# Patient Record
Sex: FEMALE | Race: WHITE | Employment: STUDENT | URBAN - METROPOLITAN AREA
[De-identification: names, ages, dates, MRNs, and addresses within clinical notes are randomized per-mention and may not be internally consistent; named-entity substitution may affect disease eponyms.]

---
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
## 2020-01-16 ENCOUNTER — APPOINTMENT (OUTPATIENT)
Dept: GENERAL RADIOLOGY | Facility: HOSPITAL | Age: 20
End: 2020-01-16
Attending: EMERGENCY MEDICINE
Payer: COMMERCIAL

## 2020-01-16 ENCOUNTER — HOSPITAL ENCOUNTER (EMERGENCY)
Facility: HOSPITAL | Age: 20
Discharge: HOME | End: 2020-01-16
Attending: EMERGENCY MEDICINE
Payer: COMMERCIAL

## 2020-01-16 VITALS
WEIGHT: 175 LBS | HEIGHT: 66 IN | OXYGEN SATURATION: 100 % | SYSTOLIC BLOOD PRESSURE: 130 MMHG | TEMPERATURE: 98 F | RESPIRATION RATE: 16 BRPM | DIASTOLIC BLOOD PRESSURE: 78 MMHG | BODY MASS INDEX: 28.13 KG/M2 | HEART RATE: 74 BPM

## 2020-01-16 DIAGNOSIS — M54.9 BACK PAIN WITHOUT RADIATION: ICD-10-CM

## 2020-01-16 DIAGNOSIS — R06.02 SOB (SHORTNESS OF BREATH): Primary | ICD-10-CM

## 2020-01-16 LAB
B-HCG UR QL: NEGATIVE
BILIRUB UR QL: NEGATIVE
COLOR UR: YELLOW
GLUCOSE UR-MCNC: NEGATIVE MG/DL
KETONES UR-MCNC: NEGATIVE MG/DL
LEUKOCYTE ESTERASE UR QL STRIP.AUTO: NEGATIVE
NITRITE UR QL STRIP.AUTO: NEGATIVE
PH UR: 5 [PH] (ref 5–8)
PROT UR-MCNC: NEGATIVE MG/DL
RBC #/AREA URNS AUTO: 3 /HPF
SP GR UR STRIP: 1.01 (ref 1–1.03)
UROBILINOGEN UR STRIP-ACNC: <2
WBC #/AREA URNS AUTO: <1 /HPF

## 2020-01-16 PROCEDURE — 99284 EMERGENCY DEPT VISIT MOD MDM: CPT

## 2020-01-16 PROCEDURE — 93005 ELECTROCARDIOGRAM TRACING: CPT

## 2020-01-16 PROCEDURE — 93010 ELECTROCARDIOGRAM REPORT: CPT | Performed by: EMERGENCY MEDICINE

## 2020-01-16 PROCEDURE — 81001 URINALYSIS AUTO W/SCOPE: CPT | Performed by: EMERGENCY MEDICINE

## 2020-01-16 PROCEDURE — 81001 URINALYSIS AUTO W/SCOPE: CPT

## 2020-01-16 PROCEDURE — 81025 URINE PREGNANCY TEST: CPT

## 2020-01-16 PROCEDURE — 71046 X-RAY EXAM CHEST 2 VIEWS: CPT | Performed by: EMERGENCY MEDICINE

## 2020-01-17 NOTE — ED PROVIDER NOTES
Patient Seen in: [facility]      History   Patient presents with:  Back Pain  Body ache and/or chills    Stated Complaint:     HPI    77-year-old female presents the ER with complaint of an episode of shortness of breath and b and Rhythm: Normal rate and regular rhythm. Pulses: Normal pulses. Heart sounds: Normal heart sounds. Pulmonary:      Effort: Pulmonary effort is normal.      Breath sounds: Normal breath sounds.    Abdominal:      General: Bowel sounds are norm

## 2020-01-17 NOTE — ED INITIAL ASSESSMENT (HPI)
Pt states sudden onset of upper back pain with chills and dyspnea prior to arrival. Pt states states all symptoms are now gone. Pt denies fall or trauma. No respiratory distress noted. 100% on room air.

## 2020-09-16 ENCOUNTER — LAB ENCOUNTER (OUTPATIENT)
Dept: LAB | Facility: HOSPITAL | Age: 20
End: 2020-09-16
Attending: ADVANCED PRACTICE MIDWIFE

## 2020-09-16 ENCOUNTER — OFFICE VISIT (OUTPATIENT)
Dept: OBGYN CLINIC | Facility: CLINIC | Age: 20
End: 2020-09-16
Payer: COMMERCIAL

## 2020-09-16 VITALS
HEART RATE: 92 BPM | WEIGHT: 197 LBS | SYSTOLIC BLOOD PRESSURE: 127 MMHG | HEIGHT: 66 IN | DIASTOLIC BLOOD PRESSURE: 79 MMHG | BODY MASS INDEX: 31.66 KG/M2

## 2020-09-16 DIAGNOSIS — E66.9 OBESITY (BMI 30-39.9): ICD-10-CM

## 2020-09-16 DIAGNOSIS — N91.2 AMENORRHEA: ICD-10-CM

## 2020-09-16 DIAGNOSIS — Z32.02 PREGNANCY EXAMINATION OR TEST, NEGATIVE RESULT: ICD-10-CM

## 2020-09-16 DIAGNOSIS — Z11.3 SCREEN FOR STD (SEXUALLY TRANSMITTED DISEASE): ICD-10-CM

## 2020-09-16 DIAGNOSIS — N91.2 AMENORRHEA: Primary | ICD-10-CM

## 2020-09-16 LAB
B-HCG SERPL-ACNC: <1 MIU/ML
CONTROL LINE PRESENT WITH A CLEAR BACKGROUND (YES/NO): YES YES/NO
KIT LOT #: NORMAL NUMERIC
PREGNANCY TEST, URINE: NEGATIVE
TSI SER-ACNC: 0.81 MIU/ML (ref 0.36–3.74)

## 2020-09-16 PROCEDURE — 3078F DIAST BP <80 MM HG: CPT | Performed by: ADVANCED PRACTICE MIDWIFE

## 2020-09-16 PROCEDURE — 36415 COLL VENOUS BLD VENIPUNCTURE: CPT

## 2020-09-16 PROCEDURE — 3008F BODY MASS INDEX DOCD: CPT | Performed by: ADVANCED PRACTICE MIDWIFE

## 2020-09-16 PROCEDURE — 99214 OFFICE O/P EST MOD 30 MIN: CPT | Performed by: ADVANCED PRACTICE MIDWIFE

## 2020-09-16 PROCEDURE — 84443 ASSAY THYROID STIM HORMONE: CPT

## 2020-09-16 PROCEDURE — 81025 URINE PREGNANCY TEST: CPT | Performed by: ADVANCED PRACTICE MIDWIFE

## 2020-09-16 PROCEDURE — 3074F SYST BP LT 130 MM HG: CPT | Performed by: ADVANCED PRACTICE MIDWIFE

## 2020-09-16 PROCEDURE — 84702 CHORIONIC GONADOTROPIN TEST: CPT

## 2020-09-16 RX ORDER — MEDROXYPROGESTERONE ACETATE 10 MG/1
10 TABLET ORAL DAILY
Qty: 10 TABLET | Refills: 0 | Status: SHIPPED | OUTPATIENT
Start: 2020-09-16 | End: 2020-09-26

## 2020-09-16 NOTE — PROGRESS NOTES
HPI:    Patient ID: Josette Jay is a 23year old female who presents for amenorrhea. Last menstrual period was in June. Reports menses were irregular before that. Denies any excessive hair growth, headaches, visual changes or leaking from breasts. exercise changes. Offered bariatric services at this time and pt declines  Risks of OCP's especially DVT, elevated blood pressure, and failure resulting in pregnancy were reviewed.   Increased risk for heart attack and stroke especially with tobacco use wa

## 2020-09-17 ENCOUNTER — TELEPHONE (OUTPATIENT)
Dept: OBGYN CLINIC | Facility: CLINIC | Age: 20
End: 2020-09-17

## 2020-09-17 LAB
C TRACH DNA SPEC QL NAA+PROBE: NEGATIVE
N GONORRHOEA DNA SPEC QL NAA+PROBE: NEGATIVE

## 2020-09-17 NOTE — TELEPHONE ENCOUNTER
----- Message from Luh Corcoran CNM sent at 9/17/2020  4:34 PM CDT -----  Labs are normal.  Pt should start progesterone as rx

## 2020-09-21 ENCOUNTER — TELEPHONE (OUTPATIENT)
Dept: OBGYN CLINIC | Facility: CLINIC | Age: 20
End: 2020-09-21

## 2021-01-13 ENCOUNTER — HOSPITAL ENCOUNTER (OUTPATIENT)
Age: 21
Discharge: HOME | End: 2021-01-13
Payer: COMMERCIAL

## 2021-01-13 VITALS
TEMPERATURE: 98 F | HEIGHT: 66 IN | RESPIRATION RATE: 16 BRPM | HEART RATE: 99 BPM | DIASTOLIC BLOOD PRESSURE: 70 MMHG | WEIGHT: 190 LBS | OXYGEN SATURATION: 98 % | BODY MASS INDEX: 30.53 KG/M2 | SYSTOLIC BLOOD PRESSURE: 133 MMHG

## 2021-01-13 DIAGNOSIS — J02.9 SORE THROAT: Primary | ICD-10-CM

## 2021-01-13 DIAGNOSIS — U07.1 COVID-19: ICD-10-CM

## 2021-01-13 LAB
S PYO AG THROAT QL: NEGATIVE
SARS-COV-2 RNA RESP QL NAA+PROBE: DETECTED

## 2021-01-13 PROCEDURE — 99213 OFFICE O/P EST LOW 20 MIN: CPT | Performed by: NURSE PRACTITIONER

## 2021-01-13 PROCEDURE — 87880 STREP A ASSAY W/OPTIC: CPT | Performed by: NURSE PRACTITIONER

## 2021-01-14 NOTE — ED PROVIDER NOTES
Patient presents with:  Sore Throat      HPI:     Tatyana Rowe is a 21year old female who presents for a sore throat for 2 days. No fevers or chills. She states at times she wakes up with some nasal congestion and a dry cough.  No difficulty breathin Attends meetings of clubs or organizations: Not on file        Relationship status: Not on file      Intimate partner violence        Fear of current or ex partner: Not on file        Emotionally abused: Not on file        Physically abused: Not on file POCT Rapid Strep      Contact and droplet isolation status Continuous      Labs performed this visit:  Recent Results (from the past 10 hour(s))   [facility] POCT RAPID STREP    Collection Time: 01/13/21  7:46 PM   Result Value Ref Range    POCT Rapid Strep Negat

## 2021-12-29 ENCOUNTER — HOSPITAL ENCOUNTER (OUTPATIENT)
Age: 21
Discharge: HOME | End: 2021-12-29
Payer: COMMERCIAL

## 2021-12-29 VITALS
HEIGHT: 66 IN | RESPIRATION RATE: 18 BRPM | BODY MASS INDEX: 31.34 KG/M2 | HEART RATE: 87 BPM | OXYGEN SATURATION: 100 % | SYSTOLIC BLOOD PRESSURE: 115 MMHG | TEMPERATURE: 97 F | DIASTOLIC BLOOD PRESSURE: 66 MMHG | WEIGHT: 195 LBS

## 2021-12-29 DIAGNOSIS — Z20.822 ENCOUNTER FOR LABORATORY TESTING FOR COVID-19 VIRUS: Primary | ICD-10-CM

## 2021-12-29 DIAGNOSIS — J02.9 SORE THROAT: ICD-10-CM

## 2021-12-29 LAB — S PYO AG THROAT QL: NEGATIVE

## 2021-12-29 PROCEDURE — 87880 STREP A ASSAY W/OPTIC: CPT | Performed by: PHYSICIAN ASSISTANT

## 2021-12-29 PROCEDURE — 99213 OFFICE O/P EST LOW 20 MIN: CPT | Performed by: PHYSICIAN ASSISTANT

## 2021-12-29 NOTE — ED INITIAL ASSESSMENT (HPI)
Pt presents to the IC with c/o a sore throat and nasal congestion since earlier this week. +exposure to covid.

## 2021-12-30 NOTE — ED PROVIDER NOTES
Patient Seen in: [facility] [place]      History   Patient presents with:  Cough/URI    Stated Complaint: VWR: [id number] Covid/strep Test- sore throat,runny nose,cough    Subjective:   HPI    23 yo female here for evaluation of sore throat, runny no General: No focal deficit present. Mental Status: She is alert and oriented to person, place, and time.    Psychiatric:         Mood and Affect: Mood normal.         Behavior: Behavior normal.            ED Course     Labs Reviewed   POCT RAPID STREP -

## 2022-01-01 LAB — SARS-COV-2 RNA RESP QL NAA+PROBE: NOT DETECTED

## 2022-01-20 ENCOUNTER — HOSPITAL ENCOUNTER (OUTPATIENT)
Age: 22
Discharge: HOME | End: 2022-01-20
Payer: COMMERCIAL

## 2022-01-20 DIAGNOSIS — Z20.822 ENCOUNTER FOR LABORATORY TESTING FOR COVID-19 VIRUS: Primary | ICD-10-CM

## 2022-01-22 LAB — SARS-COV-2 RNA RESP QL NAA+PROBE: DETECTED

## 2022-10-18 ENCOUNTER — HOSPITAL ENCOUNTER (OUTPATIENT)
Age: 22
Discharge: HOME | End: 2022-10-18

## 2022-10-18 VITALS
DIASTOLIC BLOOD PRESSURE: 66 MMHG | OXYGEN SATURATION: 99 % | SYSTOLIC BLOOD PRESSURE: 129 MMHG | RESPIRATION RATE: 20 BRPM | HEART RATE: 81 BPM | TEMPERATURE: 97 F

## 2022-10-18 DIAGNOSIS — J06.9 VIRAL URI: ICD-10-CM

## 2022-10-18 DIAGNOSIS — Z20.822 ENCOUNTER FOR LABORATORY TESTING FOR COVID-19 VIRUS: Primary | ICD-10-CM

## 2022-10-18 LAB — SARS-COV-2 RNA RESP QL NAA+PROBE: NOT DETECTED

## 2022-10-18 PROCEDURE — 99213 OFFICE O/P EST LOW 20 MIN: CPT | Performed by: PHYSICIAN ASSISTANT

## 2022-10-18 PROCEDURE — U0002 COVID-19 LAB TEST NON-CDC: HCPCS | Performed by: PHYSICIAN ASSISTANT

## 2022-11-19 NOTE — ASSESSMENT & PLAN NOTE
Start Augmentin 875 mg PO BID for 10 days. Supportive care includes:    • encourage fluid intake   • Advise patient to take Tylenol/Ibuprofen as needed for pain.   • warm salt water gargles   • throat lozenges, hard candy, or frozen desserts   • soft foods Attending

## 2023-01-06 ENCOUNTER — OFFICE VISIT (OUTPATIENT)
Dept: FAMILY MEDICINE CLINIC | Facility: CLINIC | Age: 23
End: 2023-01-06
Payer: COMMERCIAL

## 2023-01-06 VITALS
SYSTOLIC BLOOD PRESSURE: 123 MMHG | WEIGHT: 185 LBS | DIASTOLIC BLOOD PRESSURE: 79 MMHG | HEART RATE: 99 BPM | OXYGEN SATURATION: 96 % | HEIGHT: 65.5 IN | BODY MASS INDEX: 30.45 KG/M2

## 2023-01-06 DIAGNOSIS — Z91.018 FOOD ALLERGY: ICD-10-CM

## 2023-01-06 DIAGNOSIS — F41.1 GAD (GENERALIZED ANXIETY DISORDER): ICD-10-CM

## 2023-01-06 DIAGNOSIS — Z00.129 ENCOUNTER FOR ROUTINE CHILD HEALTH EXAMINATION WITHOUT ABNORMAL FINDINGS: Primary | ICD-10-CM

## 2023-01-06 PROCEDURE — 3078F DIAST BP <80 MM HG: CPT | Performed by: FAMILY MEDICINE

## 2023-01-06 PROCEDURE — 3008F BODY MASS INDEX DOCD: CPT | Performed by: FAMILY MEDICINE

## 2023-01-06 PROCEDURE — 99395 PREV VISIT EST AGE 18-39: CPT | Performed by: FAMILY MEDICINE

## 2023-01-06 PROCEDURE — 90715 TDAP VACCINE 7 YRS/> IM: CPT | Performed by: FAMILY MEDICINE

## 2023-01-06 PROCEDURE — 90471 IMMUNIZATION ADMIN: CPT | Performed by: FAMILY MEDICINE

## 2023-01-06 PROCEDURE — 3074F SYST BP LT 130 MM HG: CPT | Performed by: FAMILY MEDICINE

## 2023-03-13 ENCOUNTER — OFFICE VISIT (OUTPATIENT)
Dept: FAMILY MEDICINE CLINIC | Facility: CLINIC | Age: 23
End: 2023-03-13

## 2023-03-13 VITALS
WEIGHT: 184 LBS | BODY MASS INDEX: 30.29 KG/M2 | HEART RATE: 93 BPM | DIASTOLIC BLOOD PRESSURE: 74 MMHG | HEIGHT: 65.5 IN | SYSTOLIC BLOOD PRESSURE: 124 MMHG

## 2023-03-13 DIAGNOSIS — M54.50 THORACOLUMBAR BACK PAIN: Primary | ICD-10-CM

## 2023-03-13 DIAGNOSIS — M54.6 THORACOLUMBAR BACK PAIN: Primary | ICD-10-CM

## 2023-03-13 PROCEDURE — 99212 OFFICE O/P EST SF 10 MIN: CPT | Performed by: FAMILY MEDICINE

## 2023-03-13 PROCEDURE — 3008F BODY MASS INDEX DOCD: CPT | Performed by: FAMILY MEDICINE

## 2023-03-13 PROCEDURE — 3074F SYST BP LT 130 MM HG: CPT | Performed by: FAMILY MEDICINE

## 2023-03-13 PROCEDURE — 3078F DIAST BP <80 MM HG: CPT | Performed by: FAMILY MEDICINE

## 2023-03-13 NOTE — PROGRESS NOTES
Blood pressure 124/74, pulse 93, height 5' 5.5\" (1.664 m), weight 184 lb (83.5 kg), last menstrual period 11/19/2022, not currently breastfeeding. Complaining of intermittent mid to lower back pain. Denies pain down the legs no injury. No pain at this time. Works at [employer] all day and [employer]. Small company.     Objective patient is comfortable no apparent distress    Assessment intermittent mid to lower back pain asymptomatic at this time    Plan ergonomics information given patient to get a Sulema roll for the back follow-up if no improvement

## 2023-06-03 ENCOUNTER — OFFICE VISIT (OUTPATIENT)
Dept: FAMILY MEDICINE CLINIC | Facility: CLINIC | Age: 23
End: 2023-06-03
Payer: COMMERCIAL

## 2023-06-03 VITALS
HEIGHT: 65.5 IN | TEMPERATURE: 99 F | RESPIRATION RATE: 18 BRPM | WEIGHT: 195 LBS | DIASTOLIC BLOOD PRESSURE: 76 MMHG | HEART RATE: 113 BPM | OXYGEN SATURATION: 98 % | BODY MASS INDEX: 32.1 KG/M2 | SYSTOLIC BLOOD PRESSURE: 134 MMHG

## 2023-06-03 DIAGNOSIS — J01.90 ACUTE RHINOSINUSITIS: Primary | ICD-10-CM

## 2023-06-03 DIAGNOSIS — J02.9 SORE THROAT: ICD-10-CM

## 2023-06-03 LAB
CONTROL LINE PRESENT WITH A CLEAR BACKGROUND (YES/NO): YES YES/NO
KIT EXPIRATION DATE: NORMAL DATE
KIT LOT #: NORMAL NUMERIC
STREP GRP A CUL-SCR: NEGATIVE

## 2023-06-03 PROCEDURE — 3075F SYST BP GE 130 - 139MM HG: CPT | Performed by: PHYSICIAN ASSISTANT

## 2023-06-03 PROCEDURE — 3078F DIAST BP <80 MM HG: CPT | Performed by: PHYSICIAN ASSISTANT

## 2023-06-03 PROCEDURE — 87880 STREP A ASSAY W/OPTIC: CPT | Performed by: PHYSICIAN ASSISTANT

## 2023-06-03 PROCEDURE — 3008F BODY MASS INDEX DOCD: CPT | Performed by: PHYSICIAN ASSISTANT

## 2023-06-03 PROCEDURE — 87635 SARS-COV-2 COVID-19 AMP PRB: CPT | Performed by: PHYSICIAN ASSISTANT

## 2023-06-03 PROCEDURE — 99213 OFFICE O/P EST LOW 20 MIN: CPT | Performed by: PHYSICIAN ASSISTANT

## 2023-06-03 RX ORDER — AMOXICILLIN AND CLAVULANATE POTASSIUM 875; 125 MG/1; MG/1
1 TABLET, FILM COATED ORAL 2 TIMES DAILY
Qty: 14 TABLET | Refills: 0 | Status: SHIPPED | OUTPATIENT
Start: 2023-06-03 | End: 2023-06-10

## 2023-06-04 LAB — SARS-COV-2 RNA RESP QL NAA+PROBE: NOT DETECTED

## 2024-06-28 ENCOUNTER — OFFICE VISIT (OUTPATIENT)
Dept: FAMILY MEDICINE CLINIC | Facility: CLINIC | Age: 24
End: 2024-06-28

## 2024-06-28 VITALS
SYSTOLIC BLOOD PRESSURE: 106 MMHG | WEIGHT: 225 LBS | DIASTOLIC BLOOD PRESSURE: 66 MMHG | HEIGHT: 65.5 IN | BODY MASS INDEX: 37.04 KG/M2 | HEART RATE: 88 BPM

## 2024-06-28 DIAGNOSIS — R63.5 WEIGHT GAIN: Primary | ICD-10-CM

## 2024-06-28 PROCEDURE — 99213 OFFICE O/P EST LOW 20 MIN: CPT | Performed by: FAMILY MEDICINE

## 2024-06-28 NOTE — PROGRESS NOTES
Blood pressure 106/66, pulse 88, height 5' 5.5\" (1.664 m), weight 225 lb (102.1 kg), not currently breastfeeding.          Patient presents today concerned about weight gain.  She states the weight gain began before she started the sertraline.  She also reports that she feels the sertraline is working for her anxiety very well denies any suicidal ideation no drug use.    Condoms for contraception.    Objective body mass index 36.7    Assessment weight gain    Plan fasting blood test ordered will follow with results dietary information given discussed exercise

## 2024-06-29 LAB
ABSOLUTE BASOPHILS: 58 CELLS/UL (ref 0–200)
ABSOLUTE EOSINOPHILS: 446 CELLS/UL (ref 15–500)
ABSOLUTE LYMPHOCYTES: 2735 CELLS/UL (ref 850–3900)
ABSOLUTE MONOCYTES: 553 CELLS/UL (ref 200–950)
ABSOLUTE NEUTROPHILS: 5907 CELLS/UL (ref 1500–7800)
BASOPHILS: 0.6 %
CHOL/HDLC RATIO: 3.5 (CALC)
CHOLESTEROL, TOTAL: 163 MG/DL
EOSINOPHILS: 4.6 %
GLUCOSE: 94 MG/DL (ref 65–139)
HCG, TOTAL, QL: POSITIVE
HDL CHOLESTEROL: 46 MG/DL
HEMATOCRIT: 38 % (ref 35–45)
HEMOGLOBIN A1C: 5.5 % OF TOTAL HGB
HEMOGLOBIN: 12.2 G/DL (ref 11.7–15.5)
LDL-CHOLESTEROL: 94 MG/DL (CALC)
LYMPHOCYTES: 28.2 %
MCH: 29.2 PG (ref 27–33)
MCHC: 32.1 G/DL (ref 32–36)
MCV: 90.9 FL (ref 80–100)
MONOCYTES: 5.7 %
MPV: 10.1 FL (ref 7.5–12.5)
NEUTROPHILS: 60.9 %
NON-HDL CHOLESTEROL: 117 MG/DL (CALC)
PLATELET COUNT: 379 THOUSAND/UL (ref 140–400)
RDW: 11.8 % (ref 11–15)
RED BLOOD CELL COUNT: 4.18 MILLION/UL (ref 3.8–5.1)
TRIGLYCERIDES: 132 MG/DL
TSH W/REFLEX TO FT4: 1.17 MIU/L
VITAMIN D, 25-OH, TOTAL: 10 NG/ML (ref 30–100)
WHITE BLOOD CELL COUNT: 9.7 THOUSAND/UL (ref 3.8–10.8)

## 2024-07-05 ENCOUNTER — OFFICE VISIT (OUTPATIENT)
Dept: OBGYN CLINIC | Facility: CLINIC | Age: 24
End: 2024-07-05
Payer: COMMERCIAL

## 2024-07-05 VITALS
BODY MASS INDEX: 37.65 KG/M2 | HEIGHT: 65 IN | SYSTOLIC BLOOD PRESSURE: 117 MMHG | HEART RATE: 96 BPM | DIASTOLIC BLOOD PRESSURE: 78 MMHG | WEIGHT: 226 LBS

## 2024-07-05 DIAGNOSIS — Z71.89 PRENATAL CONSULT: Primary | ICD-10-CM

## 2024-07-05 DIAGNOSIS — Z34.91 PREGNANCY WITH UNCERTAIN DATES IN FIRST TRIMESTER (HCC): ICD-10-CM

## 2024-07-05 LAB
CONTROL LINE PRESENT WITH A CLEAR BACKGROUND (YES/NO): YES YES/NO
KIT LOT #: NORMAL NUMERIC
PREGNANCY TEST, URINE: POSITIVE

## 2024-07-05 RX ORDER — ASPIRIN 81 MG/1
120 TABLET, CHEWABLE ORAL DAILY
Qty: 60 TABLET | Refills: 2 | Status: SHIPPED | OUTPATIENT
Start: 2024-07-05

## 2024-07-05 NOTE — PROGRESS NOTES
Monet Blanc is a 23 year old female.    HPI:     Chief Complaint   Patient presents with    Gyn Exam     Missed mense; LMP 2024, light cramping no spotting. Wanted an ultrasound done to see if everything is okay.            Sure LMP 24, BOB 25, now at 5 6/7 wks.    Has somewhat irregular periods sometimes 1-2 weeks late.   Had + HGC with PCP on   Denies pain or bleeding. + nausea, No vomiting.  Denies fevers, flu like symptoms or symptoms of URI  Current medications: PNV    OB History    Para Term  AB Living   1 0 0 0 0 0   SAB IAB Ectopic Multiple Live Births   0 0 0 0 0       Period Cycle (Days): 1 week early or late  Period Duration (Days): 4-5 days  Period Flow: heavy-light  Use of Birth Control (if yes, specify type): None      HISTORY:  Past Medical History:    Constipation      History reviewed. No pertinent surgical history.   Family History   Problem Relation Age of Onset    Diabetes Maternal Grandmother     Heart Disease Maternal Grandmother     Heart Disorder Other         Sidden cardiac death-no close relative      Social History:   Social History     Socioeconomic History    Marital status: Single   Tobacco Use    Smoking status: Never    Smokeless tobacco: Never   Substance and Sexual Activity    Alcohol use: No    Drug use: No   Other Topics Concern    Caffeine Concern Yes     Comment: Sod        Medications (Active prior to today's visit):  Current Outpatient Medications   Medication Sig Dispense Refill    aspirin (ASPIRIN LOW DOSE) 81 MG Oral Chew Tab Chew 1.5 tablets (121.5 mg total) by mouth daily. 60 tablet 2    sertraline 50 MG Oral Tab Take 1.5 tablets (75 mg total) by mouth daily. 45 tablet 0       Allergies:  No Known Allergies      ROS:       History obtained from the patient  General ROS: positive for  - fatigue  negative for - chills, fever, or night sweats  Psychological ROS: negative for - anxiety, depression, mood swings, or suicidal  ideation  ENT ROS: negative for - headaches, nasal congestion, or nasal discharge  Allergy and Immunology ROS: negative for - nasal congestion or postnasal drip  Respiratory ROS: no cough, shortness of breath, or wheezing  Cardiovascular ROS: no chest pain or dyspnea on exertion  Gastrointestinal ROS: positive for - nausea/vomiting  negative for - abdominal pain or change in stools  Genito-Urinary ROS: no dysuria, trouble voiding, or hematuria  Neurological ROS: negative for - dizziness or headaches      PHYSICAL EXAM:     Vitals:    07/05/24 1331   BP: 117/78   Pulse: 96       Physical Exam  Constitutional:       General: She is not in acute distress.     Appearance: Normal appearance. She is not ill-appearing.   Pulmonary:      Effort: Pulmonary effort is normal.   Neurological:      Mental Status: She is alert and oriented to person, place, and time.   Psychiatric:         Mood and Affect: Mood normal.         Behavior: Behavior normal.               ASSESSMENT/PLAN:      Diagnoses and all orders for this visit:    Prenatal consult  -     POC Urine pregnancy test [63192]    Pregnancy with uncertain dates in first trimester (HCC)  -     US PREG 1ST TRIM W/EV (CPT=76801/60934); Future    Other orders  -     aspirin (ASPIRIN LOW DOSE) 81 MG Oral Chew Tab; Chew 1.5 tablets (121.5 mg total) by mouth daily.               1.  Continue PNV with DHA.   2.  Reviewed pregnancy recommendations and avoidances of pregnancy and written information provided.   3.  Travel discussed, increased risk of clotting in pregnancy, so recommend use of support stockings with flights longer than 3 hours, movement every 2-3 hours if driving  4.  Warning signs reviewed advised to call office if occur.    5.  First Trimester chromosomal screening, genetic screening, free Cell Fetal DNA, carrier screening and US schedule discussed.   6. Ultrasound to confirm viability and dating since has somewhat irregular cycles  7. ONTD risks discussed. no  risk factors identified.     8. Pre- Eclampsia Risk Assessment:   High risk Factors- none  Moderate Risk Factors: Primip, Obesity    High risk factors. 1 of below:  -Hx of pre-e  -Autoimmune disease (lupus, antiphospholipid syndrome)  -Multifetal pregnancy  -CHTN  -DM (type 1 or 2)    Moderate risk factors. 2 or More:   -Nullip  -Obesity  -Family hx preeclampsia (mother or sister)  - Socioeconomic characteristics (AA race, low socioeconomic status)  -AMA  -Personal history factors (Hx of low birth weight or SGA, previous adverse pregnancy outcome, > 10 yr preg interval)    Recommend start baby/low dose ASA in the 12th-13th week of gestation.  Evidence shows that it reduces the risk of preelampsia, as well as other adverse pregnancy outcomes such as  delivery and IUGR by about 10-20%.     9. Avoid hot tubs, saunas, steam rooms. Don't change cat litter if has cat.    10.  Schedule RN OB ED visit         My total time spent caring for the patient on the day of the encounter:  30 minutes, which included: chart review, exam, care coordination, charting, and counseling as per above.          2024  Jazmine Plascencia CNM

## 2024-07-22 ENCOUNTER — NURSE ONLY (OUTPATIENT)
Dept: OBGYN CLINIC | Facility: CLINIC | Age: 24
End: 2024-07-22
Payer: COMMERCIAL

## 2024-07-22 DIAGNOSIS — Z34.01 ENCOUNTER FOR SUPERVISION OF NORMAL FIRST PREGNANCY IN FIRST TRIMESTER (HCC): Primary | ICD-10-CM

## 2024-07-22 NOTE — PROGRESS NOTES
Pt Name and  verified.  OB History    Para Term  AB Living   1 0 0 0 0 0   SAB IAB Ectopic Multiple Live Births   0 0 0 0 0       Pt called today for RN OB Education. [place] about midwives through her mom a long time ago. Would like to have epidural and vaginal delivery but understands if she were to need a .     LMP: 2024    Pre  BMI: 32.45    EPDS score:    Working BOB: 2025  Hx of genetic abnormality in family: none  Hx of varicella: unsure, may have had them.   Covid Vaccine: not vaccinated.    Sterilization/Contraception: most likely not possibly just condoms.     SDOH Screening: Pt. Answered YES to any 5P questions no  risk factors are present.     Pt. Education was provided on OUD and pregnancy, including the benefits of treatment for mom and baby and the risk of LUDY. Pt. Given OUD and LUDY handouts.    Educational material reviewed with patient: Prenatal care, nutrition, weight gain recommendations, travel, exercise, intercourse, pregnancy changes, safe medications, pregnancy and work, fetal movement, labor and  labor, warning signs, food safety, tdap, cord blood, breastfeeding yes and formula feed , circumcision undecided, and Group B strep.     Blood transfusion if needed: yes, if needed in PN care.     PN labs: placed and pt aware to complete prior to New Ob apt.     Optional genetic screening labs were reviewed: Cell FreeDNA, FTS with US, Quad screen MSAFP and CF screening. Would like Maria NIPT testing. Kit to be picked up by pt.     [facility] Media Policy: reviewed and voiced understanding.     NOB apt:  scheduled with Phyllis Teixeira

## 2024-08-16 ENCOUNTER — HOSPITAL ENCOUNTER (OUTPATIENT)
Dept: ULTRASOUND IMAGING | Age: 24
Discharge: HOME | End: 2024-08-16
Attending: ADVANCED PRACTICE MIDWIFE
Payer: COMMERCIAL

## 2024-08-16 DIAGNOSIS — Z34.91 PREGNANCY WITH UNCERTAIN DATES IN FIRST TRIMESTER (HCC): ICD-10-CM

## 2024-08-16 PROCEDURE — 76801 OB US < 14 WKS SINGLE FETUS: CPT | Performed by: ADVANCED PRACTICE MIDWIFE

## 2024-08-19 ENCOUNTER — INITIAL PRENATAL (OUTPATIENT)
Dept: OBGYN CLINIC | Facility: CLINIC | Age: 24
End: 2024-08-19
Payer: COMMERCIAL

## 2024-08-19 ENCOUNTER — TELEPHONE (OUTPATIENT)
Dept: OBGYN CLINIC | Facility: CLINIC | Age: 24
End: 2024-08-19

## 2024-08-19 ENCOUNTER — LAB ENCOUNTER (OUTPATIENT)
Dept: LAB | Facility: HOSPITAL | Age: 24
End: 2024-08-19
Attending: ADVANCED PRACTICE MIDWIFE
Payer: COMMERCIAL

## 2024-08-19 VITALS
HEART RATE: 106 BPM | SYSTOLIC BLOOD PRESSURE: 117 MMHG | BODY MASS INDEX: 37 KG/M2 | DIASTOLIC BLOOD PRESSURE: 76 MMHG | WEIGHT: 221 LBS

## 2024-08-19 DIAGNOSIS — Z34.91 PRENATAL CARE, FIRST TRIMESTER (HCC): Primary | ICD-10-CM

## 2024-08-19 DIAGNOSIS — Z12.4 CERVICAL CANCER SCREENING: ICD-10-CM

## 2024-08-19 DIAGNOSIS — Z11.3 SCREENING EXAMINATION FOR STI: ICD-10-CM

## 2024-08-19 DIAGNOSIS — Z34.01 ENCOUNTER FOR SUPERVISION OF NORMAL FIRST PREGNANCY IN FIRST TRIMESTER (HCC): Primary | ICD-10-CM

## 2024-08-19 PROBLEM — Z00.129 ENCOUNTER FOR ROUTINE CHILD HEALTH EXAMINATION WITHOUT ABNORMAL FINDINGS: Status: RESOLVED | Noted: 2017-05-05 | Resolved: 2024-08-19

## 2024-08-19 PROBLEM — F90.9 ADHD: Status: ACTIVE | Noted: 2024-08-19

## 2024-08-19 PROBLEM — F41.9 ANXIETY AND DEPRESSION: Status: ACTIVE | Noted: 2024-08-19

## 2024-08-19 PROBLEM — F32.A ANXIETY AND DEPRESSION: Status: ACTIVE | Noted: 2024-08-19

## 2024-08-19 LAB
ANTIBODY SCREEN: NEGATIVE
BASOPHILS # BLD AUTO: 0.05 X10(3) UL (ref 0–0.2)
BASOPHILS NFR BLD AUTO: 0.4 %
DEPRECATED RDW RBC AUTO: 38.1 FL (ref 35.1–46.3)
EOSINOPHIL # BLD AUTO: 0.44 X10(3) UL (ref 0–0.7)
EOSINOPHIL NFR BLD AUTO: 3.6 %
ERYTHROCYTE [DISTWIDTH] IN BLOOD BY AUTOMATED COUNT: 11.9 % (ref 11–15)
EST. AVERAGE GLUCOSE BLD GHB EST-MCNC: 100 MG/DL (ref 68–126)
HBA1C MFR BLD: 5.1 % (ref ?–5.7)
HBV SURFACE AG SER-ACNC: 0.13 [IU]/L
HBV SURFACE AG SERPL QL IA: NONREACTIVE
HCT VFR BLD AUTO: 37.5 %
HCV AB SERPL QL IA: NONREACTIVE
HGB BLD-MCNC: 13.1 G/DL
IMM GRANULOCYTES # BLD AUTO: 0.04 X10(3) UL (ref 0–1)
IMM GRANULOCYTES NFR BLD: 0.3 %
LYMPHOCYTES # BLD AUTO: 3.05 X10(3) UL (ref 1–4)
LYMPHOCYTES NFR BLD AUTO: 24.9 %
MCH RBC QN AUTO: 30.3 PG (ref 26–34)
MCHC RBC AUTO-ENTMCNC: 34.9 G/DL (ref 31–37)
MCV RBC AUTO: 86.8 FL
MONOCYTES # BLD AUTO: 0.79 X10(3) UL (ref 0.1–1)
MONOCYTES NFR BLD AUTO: 6.4 %
NEUTROPHILS # BLD AUTO: 7.89 X10 (3) UL (ref 1.5–7.7)
NEUTROPHILS # BLD AUTO: 7.89 X10(3) UL (ref 1.5–7.7)
NEUTROPHILS NFR BLD AUTO: 64.4 %
PLATELET # BLD AUTO: 388 10(3)UL (ref 150–450)
RBC # BLD AUTO: 4.32 X10(6)UL
RH BLOOD TYPE: POSITIVE
RUBV IGG SER QL: POSITIVE
RUBV IGG SER-ACNC: 32 IU/ML (ref 10–?)
T PALLIDUM AB SER QL IA: NONREACTIVE
WBC # BLD AUTO: 12.3 X10(3) UL (ref 4–11)

## 2024-08-19 PROCEDURE — 87340 HEPATITIS B SURFACE AG IA: CPT | Performed by: ADVANCED PRACTICE MIDWIFE

## 2024-08-19 PROCEDURE — 86850 RBC ANTIBODY SCREEN: CPT

## 2024-08-19 PROCEDURE — 36415 COLL VENOUS BLD VENIPUNCTURE: CPT | Performed by: ADVANCED PRACTICE MIDWIFE

## 2024-08-19 PROCEDURE — 83020 HEMOGLOBIN ELECTROPHORESIS: CPT

## 2024-08-19 PROCEDURE — 87086 URINE CULTURE/COLONY COUNT: CPT | Performed by: ADVANCED PRACTICE MIDWIFE

## 2024-08-19 PROCEDURE — 86803 HEPATITIS C AB TEST: CPT | Performed by: ADVANCED PRACTICE MIDWIFE

## 2024-08-19 PROCEDURE — 83036 HEMOGLOBIN GLYCOSYLATED A1C: CPT | Performed by: ADVANCED PRACTICE MIDWIFE

## 2024-08-19 PROCEDURE — 85025 COMPLETE CBC W/AUTO DIFF WBC: CPT | Performed by: ADVANCED PRACTICE MIDWIFE

## 2024-08-19 PROCEDURE — 86762 RUBELLA ANTIBODY: CPT

## 2024-08-19 PROCEDURE — 86787 VARICELLA-ZOSTER ANTIBODY: CPT | Performed by: ADVANCED PRACTICE MIDWIFE

## 2024-08-19 PROCEDURE — 87389 HIV-1 AG W/HIV-1&-2 AB AG IA: CPT | Performed by: ADVANCED PRACTICE MIDWIFE

## 2024-08-19 PROCEDURE — 86900 BLOOD TYPING SEROLOGIC ABO: CPT | Performed by: ADVANCED PRACTICE MIDWIFE

## 2024-08-19 PROCEDURE — 86901 BLOOD TYPING SEROLOGIC RH(D): CPT | Performed by: ADVANCED PRACTICE MIDWIFE

## 2024-08-19 PROCEDURE — 86780 TREPONEMA PALLIDUM: CPT | Performed by: ADVANCED PRACTICE MIDWIFE

## 2024-08-19 PROCEDURE — 83021 HEMOGLOBIN CHROMOTOGRAPHY: CPT

## 2024-08-19 RX ORDER — MULTIVIT,IRON,MINERALS/LUTEIN
TABLET ORAL
COMMUNITY

## 2024-08-19 NOTE — TELEPHONE ENCOUNTER
Pt called and informed that her first trimester labs need to be completed. Pt voices she will complete theses labs before her 6 pm appointment tonight with Phyllis Teixeira. Orders placed.

## 2024-08-19 NOTE — PROGRESS NOTES
Here for NOB visit. Reports fatigue and nausea. Denies bleeding, pelvic pain, vomiting     Patient's last menstrual period was 2024 (exact date). 3/7/2025, by Ultrasound 11w3d     NOB labs- Done before visit  Genetic screening- declines  Ultrasound: 24 at 11.0wks  Med hx: anxiety, depression, ADHD  Allergies: NKDA.    Problem list- Updated  EPDS=2 on 24 at RN education visit    Pre-e risk: obesity. Will start ASA  Prior births:n/a    Physical: WNL  Pap: collected and sent    Warning signs reviewed.

## 2024-08-19 NOTE — TELEPHONE ENCOUNTER
----- Message from Phyllis Teixeira sent at 8/18/2024  1:21 PM CDT -----  Regarding: NOB labs  This patient has her NOB visit with me on Monday. She had her nurse education visit but it looks like labs were not ordered. Please place order and call patient to have her complete them prior to visit. Thanks!

## 2024-08-20 LAB
C TRACH DNA SPEC QL NAA+PROBE: NEGATIVE
N GONORRHOEA DNA SPEC QL NAA+PROBE: NEGATIVE

## 2024-08-21 LAB
HGB A2 MFR BLD: 2.5 % (ref 1.5–3.5)
HGB PNL BLD ELPH: 97.5 % (ref 95.5–100)
VZV IGG SER IA-ACNC: 53.96 (ref 165–?)

## 2024-08-23 PROBLEM — O09.899 MATERNAL VARICELLA, NON-IMMUNE (HCC): Status: ACTIVE | Noted: 2024-08-23

## 2024-08-23 PROBLEM — Z28.39 MATERNAL VARICELLA, NON-IMMUNE (HCC): Status: ACTIVE | Noted: 2024-08-23

## 2024-09-18 ENCOUNTER — ROUTINE PRENATAL (OUTPATIENT)
Dept: OBGYN CLINIC | Facility: CLINIC | Age: 24
End: 2024-09-18

## 2024-09-18 VITALS
SYSTOLIC BLOOD PRESSURE: 130 MMHG | HEART RATE: 102 BPM | DIASTOLIC BLOOD PRESSURE: 83 MMHG | WEIGHT: 219 LBS | BODY MASS INDEX: 36 KG/M2

## 2024-09-18 DIAGNOSIS — Z34.92 PRENATAL CARE, SECOND TRIMESTER (HCC): Primary | ICD-10-CM

## 2024-09-18 DIAGNOSIS — R03.0 ELEVATED BLOOD PRESSURE READING: ICD-10-CM

## 2024-09-18 DIAGNOSIS — O99.210 OBESITY IN PREGNANCY (HCC): ICD-10-CM

## 2024-09-18 PROBLEM — J03.90 ACUTE TONSILLITIS, UNSPECIFIED: Status: RESOLVED | Noted: 2019-06-05 | Resolved: 2024-09-18

## 2024-09-18 PROBLEM — N91.2 AMENORRHEA: Status: RESOLVED | Noted: 2020-09-16 | Resolved: 2024-09-18

## 2024-09-18 NOTE — PROGRESS NOTES
Active fetus Denies any complaints.  Denies any vaginal bleeding, leaking of fluid or vaginal discharge.  Pt will be traveling to [facility] and will return after 20 weeks gestation  Level 2 ultrasound ordered and liane will schedule before she leaves.  Pt fell prior to pregnancy and hurt her tailbone it has started to bother her again - will follow up with PCP Elevated Blood pressure reading retook with large cuff (appropriate size) and repeat 130/83  Pt to monitor blood pressure at home instructions given My chart link sent Warning signs reviewed  All questions answered.

## 2024-11-04 ENCOUNTER — HOSPITAL ENCOUNTER (OUTPATIENT)
Dept: PERINATAL CARE | Facility: HOSPITAL | Age: 24
Discharge: HOME | End: 2024-11-04
Attending: OBSTETRICS & GYNECOLOGY
Payer: COMMERCIAL

## 2024-11-04 ENCOUNTER — HOSPITAL ENCOUNTER (OUTPATIENT)
Dept: PERINATAL CARE | Facility: HOSPITAL | Age: 24
End: 2024-11-04
Attending: ADVANCED PRACTICE MIDWIFE
Payer: COMMERCIAL

## 2024-11-04 VITALS
DIASTOLIC BLOOD PRESSURE: 81 MMHG | WEIGHT: 212 LBS | SYSTOLIC BLOOD PRESSURE: 124 MMHG | BODY MASS INDEX: 35 KG/M2 | HEART RATE: 101 BPM

## 2024-11-04 DIAGNOSIS — E66.09 OTHER OBESITY DUE TO EXCESS CALORIES AFFECTING PREGNANCY IN SECOND TRIMESTER (HCC): Primary | ICD-10-CM

## 2024-11-04 DIAGNOSIS — O99.210 OBESITY IN PREGNANCY (HCC): ICD-10-CM

## 2024-11-04 DIAGNOSIS — O99.342 DEPRESSION AFFECTING PREGNANCY IN SECOND TRIMESTER, ANTEPARTUM (HCC): ICD-10-CM

## 2024-11-04 DIAGNOSIS — O99.212 OTHER OBESITY DUE TO EXCESS CALORIES AFFECTING PREGNANCY IN SECOND TRIMESTER (HCC): Primary | ICD-10-CM

## 2024-11-04 DIAGNOSIS — F32.A DEPRESSION AFFECTING PREGNANCY IN SECOND TRIMESTER, ANTEPARTUM (HCC): ICD-10-CM

## 2024-11-04 PROCEDURE — 76811 OB US DETAILED SNGL FETUS: CPT | Performed by: OBSTETRICS & GYNECOLOGY

## 2024-11-04 NOTE — PROGRESS NOTES
Outpatient Maternal-Fetal Medicine Consultation    Dear  Ms. Mckeon    Thank you for requesting ultrasound evaluation and maternal fetal medicine consultation on your patient Monet Blanc.  As you are aware she is a 23 year old female  with a lopez pregnancy and an Estimated Date of Delivery: 3/7/25.  A maternal-fetal medicine consultation was requested secondary to Obesity.  Her prenatal records and labs were reviewed.    First pregnancy. Taking low dose aspirin.  Taking Sertraline for anxiety and depression  ROS    HISTORY  OB History    Para Term  AB Living   1 0 0 0 0 0   SAB IAB Ectopic Multiple Live Births   0 0 0 0 0      # Outcome Date GA Lbr Markus/2nd Weight Sex Type Anes PTL Lv   1 Current                Allergies:  Allergies[1]   Current Meds:  Current Outpatient Medications   Medication Sig Dispense Refill    sertraline 50 MG Oral Tab Take 1.5 tablets (75 mg total) by mouth daily. 45 tablet 0    Prenatal Vit-Fe Fumarate-FA (MULTI PRENATAL) 27-0.8 MG Oral Tab Take by mouth.      aspirin (ASPIRIN LOW DOSE) 81 MG Oral Chew Tab Chew 1.5 tablets (121.5 mg total) by mouth daily. 60 tablet 2        HISTORY:  Past Medical History:    Acute flank pain    Acute tonsillitis, unspecified    Amenorrhea    Constipation    Decorative tattoo      No past surgical history on file.   Family History   Problem Relation Age of Onset    No Known Problems Father     No Known Problems Mother     Diabetes Maternal Grandmother     Heart Disease Maternal Grandmother     Heart Disorder Other         Sidden cardiac death-no close relative      Social History     Socioeconomic History    Marital status: Single   Tobacco Use    Smoking status: Never    Smokeless tobacco: Never   Substance and Sexual Activity    Alcohol use: No    Drug use: No   Other Topics Concern    Caffeine Concern Yes     Comment: Sod     Social Drivers of Health     Financial Resource Strain: Low Risk  (2024)    Financial  Resource Strain     Difficulty of Paying Living Expenses: Not very hard     Med Affordability: No   Food Insecurity: No Food Insecurity (8/13/2024)    Food Insecurity     Food Insecurity: Never true   Transportation Needs: No Transportation Needs (8/13/2024)    Transportation Needs     Lack of Transportation: No   Stress: No Stress Concern Present (8/13/2024)    Stress     Feeling of Stress : No   Housing Stability: Low Risk  (8/13/2024)    Housing Stability     Housing Instability: No          PHYSICAL EXAMINATION:  /81   Pulse 101   Wt 212 lb (96.2 kg)   LMP 05/25/2024 (Exact Date)   BMI 35.28 kg/m²   Physical Exam  Constitutional:       Appearance: Normal appearance.   Abdominal:      Palpations: Abdomen is soft.      Tenderness: There is no abdominal tenderness.   Neurological:      Mental Status: She is alert.   Psychiatric:         Mood and Affect: Mood normal.         Behavior: Behavior normal.           OBSTETRIC ULTRASOUND  The patient had a level II ultrasound today which revealed size consistent with dates and a normal detailed anatomic survey.  Ultrasound Findings:  Single IUP in cephalic presentation.    Placenta is posterior.   A 3 vessel cord is noted.  Cardiac activity is present at 159 bpm   g ( 1 lb 1 oz);   MVP is 5.5 cm .     The fetal measurements are consistent with the established EDC. No ultrasound evidence of structural abnormalities are seen today. The nasal bone is present. No ultrasound evidence of markers for aneuploidy are seen. She understands that ultrasound exam cannot exclude genetic abnormalities and that genetic testing is recommended. The limitations of ultrasound were discussed.     Uterus and adnexa appeared normal  today on US  See PACS/Imaging Tab For Complete Ultrasound Report  I interpreted the results and reviewed them with the patient.    DISCUSSION  During her visit we discussed and reviewed the following issues:  OBESITY:  Her BMI prior to pregnancy was  36  Obesity during pregnancy is associated with numerous maternal and  risks.  It is not clear whether obesity is a direct cause of adverse pregnancy outcome or whether the association between obesity and adverse pregnancy outcome is due to factors such as diabetes mellitus.   Data suggest that obese women should be encouraged to undertake a weight reduction program (diet, exercise, behavior modification, and possibly bariatric surgery in some cases) prior to attempting to conceive.            Subfertility in obese women is most commonly related to ovulatory dysfunction, and, in some obese women, the ovulatory dysfunction is related to polycystic ovary syndrome (PCOS). It is also important to note that even among ovulatory women, increasing obesity is associated with decreasing spontaneous pregnancy rates.  The increased risk of miscarriage in obese women may be because such women often have PCOS or isolated insulin resistance.                 Due to its strong association with obesity in the general population, type 2 diabetes mellitus is one of the two most common medical complications of the obese . The increased risk of type 2 diabetes is primarily related to an exaggerated increase in insulin resistance in the obese state. It is reasonable to screen obese gravidas for undiagnosed pregestational diabetes in the first trimester.   Glucose intolerance associated with gestational diabetes generally resolves postpartum; however, obese women with a history of gestational diabetes have a two-fold increased prevalence of subsequent type 2 diabetes.           An association between obesity and hypertensive disorders during pregnancy has been consistently reported.  In particular, maternal weight and BMI are independent risk factors for preeclampsia.             Studies have found that the increased risk of  birth in obese gravidas is primarily associated with obesity-related medical and   complications, rather than an intrinsic predisposition to spontaneous  birth. Prevention of  birth in these patients, therefore, should be directed toward prevention or management of medical and obstetrical complications.               Both prepregnancy obesity and excessive maternal weight gain before or during pregnancy contribute to an increased probability of  delivery.  It has also been hypothesized that obesity may lead to dystocia due to increased soft tissue deposition in the maternal pelvis.    delivery in the obese  is associated with numerous perioperative concerns, including emergency delivery, prolonged incision to delivery interval, blood loss >1000 mL, longer operative times, wound infection, thromboembolism, and endometritis.            Maternal obesity appears to be associated with a small increase in the absolute rate of some congenital anomalies (primarily neural tube defect and cardiac), and the risk may increase with increasing maternal weight.  Level II ultrasound is advised for women with obesity.  The risk of neural tube defects increased significantly with maternal weight.    The analysis found that overweight and obese pregnant women experienced significantly more stillbirths than normal weight women.  Third trimester  testing is advised.     Prevention of Preeclampsia    Antiplatelet Agents  The observation that preeclampsia is associated with increased platelet turnover and increased platelet-derived thromboxane levels led to randomized trials evaluating low-dose aspirin therapy in women thought to be at increased risk of the disease. As opposed to higher dose aspirin therapy, low-dose aspirin (60 to 150 mg per day) diminishes platelet thromboxane synthesis while maintaining vascular wall prostacyclin synthesis. Although not well studied, the beneficial effect of low-dose aspirin for prevention of preeclampsia may also be in part related to  modulation of inflammation. The drug has been studied for both prevention of preeclampsia and prevention of progression from mild to severe disease. It appears to result in a modest reduction in risk of preeclampsia when given to women at moderate to high risk of preeclampsia.  This approach has been studied in over 35,000 women, for both prevention of preeclampsia and prevention of progression of the disease. Low dose aspirin has a modest impact on pregnancy outcome; it reduces the risk of preeclampsia, as well as other adverse pregnancy outcomes (eg,  delivery, fetal growth restriction) by about 10 to 20 percent. Low dose aspirin is safe in pregnancy; thus, it is a reasonable strategy in women with a moderate to high risk of preeclampsia in whom the benefits outweigh the risks.     Clinical Guidelines  Based on the available data, low-dose aspirin is advised for women at high risk for preeclampsia. There is no consensus on the criteria that confer high risk. The [place] Preventive Services Task Force (USPSTF) risk criteria are reasonable.  USPSTF criteria for high risk include one or more of the following:   Previous pregnancy with preeclampsia, especially early onset and with an adverse outcome   Multifetal gestation  Chronic hypertension   Type 1 or 2 diabetes mellitus  Chronic kidney disease  Autoimmune disease (antiphospholipid syndrome, systemic lupus erythematosus)     Women with multiple moderate risk factors for preeclampsia are considered high risk, as well. Identification of women with an appropriate combination of moderate risk factors to be considered high risk is subjective and determined case by case, as the data describing the magnitude of the association between each of these risk factors and development of preeclampsia vary widely and lack consistency. USPSTF criteria for moderate risk include:  Nulliparity  Obesity (body mass index >30 kg/m2)  Family history of preeclampsia in mother  or sister  Age >=35 years  Sociodemographic characteristics (, low socioeconomic level)  Personal risk factors (eg, history of low birth weight or small for gestational age, previous adverse pregnancy outcome, >10 year pregnancy interval)     If used, daily low-dose aspirin should be initiated in the 12th or 13th week of gestation, although adverse effects from earlier initiation (eg, preconception) have not been documented. The optimum low dose is unclear; 81 mg is readily available, but 100 or 150 mg (which are not available in some countries including the [place] may be more effective.  In some pregnant women, platelet function is not inhibited by the 81 mg dose but is altered by higher dosing. Hence, current evidence has suggested a daily dose of 100 to 150 mg of aspirin rather than a lower dose. In the [place], this can be achieved by taking one and one-half 81 mg tablets; however, taking one 81 mg tablet is also reasonable since this is the commercially available dose and has proven efficacy. For prevention of preeclampsia, no trials have evaluated the efficacy of a higher dose of aspirin, which could be achieved easily by taking two 81 mg tablets or splitting a 325 mg tablet. For prevention of myocardial infarction and stroke in nonpregnant individuals, aspirin appears to be equally effective at doses between 75 and 325 mg per day.  The safety of low-dose aspirin use in the second and third trimesters is well established, but questions linger regarding use in the first trimester (possible increase in minor vaginal bleeding or gastroschisis). Early therapy (before 16 weeks) may be important since the pathophysiologic features of preeclampsia develop at this time, weeks before clinical disease is apparent. However, available evidence is inconsistent about the importance of early initiation of therapy, possibly because aspirin has major effects on prostacyclin production and endothelial  function throughout gestation.  Aspirin is discontinued 5 to 10 days before expected delivery to diminish the risk of bleeding during delivery; however, no adverse maternal or fetal effects related to low-dose aspirin have been proven.  Major questions remain as to which, if any, subgroups of women are more likely to benefit from low-dose aspirin therapy, when treatment should be started (first or second trimester), and the optimum dose to inhibit placental PGH synthase (cyclooxygenase) and also allow the anti-inflammatory effects of low-dose aspirin.               We discussed what is known about the safety of selective serotonin receptor inhibitors (SSRI) in pregnancy.    An increased risk of teratogenic effects, including cardiovascular defects, may be associated with maternal use of sertraline or other SSRIs.   Nonteratogenic effects that may been seen in the  period include respiratory distress, cyanosis, apnea, siezures, temperature instability, feeding difficulty, vomiting, hypoglycemia, increased or decreased tone, irritability, crying, hyper-reflexia, and jitteriness or tremor.   Exposure to SSRIs late in pregnancy has also been associated with persistent pulmonary hypertension of the .   Adverse effects may be due to toxic effects of the SSRI or drug withdrawal due to discontinuation. The long-term effects of in utero SSRI exposure on infant development and behavior are not known.           Due to pregnancy-induced physiologic changes, women who are pregnant may require increased doses of paroxetine to achieve euthymia. Women treated for major depression and who are euthymic prior to pregnancy are more likely to experience a relapse when medication is discontinued as compared to pregnant women who continue taking antidepressant medications. The ACOG recommends that therapy with SSRIs or SNRIs during pregnancy be individualized; treatment of depression during pregnancy should incorporate the  clinical expertise of the mental health clinician, obstetrician, primary healthcare provider, and pediatrician (ACOG, 2007). The ACOG also recommend that therapy with paroxetine(Paxil) be avoided during pregnancy if possible and that fetuses exposed in early pregnancy be assessed with a fetal echocardiography.             If treatment during pregnancy is required, tapering therapy is not advised during the third trimester despite the potential for withdrawal symptoms in the infant. Other evidence supports that inadequately treated maternal mood disorders has other effects on  behavior that can be related to impaired mother-infant bonding from inadequately treated depression/anxiety. Although this outcome is more difficult to measure, the repercussions can be significant. In addition, even with immediate use of SSRI's after delviery, given their prolonged half-life, clinical effectiveness can take several weeks. This exposes the patient to potentially significant risks of exacerbation of her mood disorder at a particularly vulnerable time. This further emphasizes the need for an individualized approach to care.         IMPRESSION:  IUP at 22w3d  Obesity  Anxiety in pregnancy    RECOMMENDATIONS:  Continue care with Ms. Mckeon  Growth US & BPP at 32 weeks  Weekly NSTs at 36 weeks  11-20 lb weight gain for pregnancy  Low dose aspirin  mg daily      Thank you for allowing me to participate in the care of your patient.  Please do not hesitate to contact me if additional questions or concerns arise.      Neeta Bone M.D.    40 minutes spent in review of records, patient consultation, documentation and coordination of care.  The relevant clinical matter(s) are summarized above.     Note to patient and family  The 21st Century Cures Act makes medical notes available to patients in the interest of transparency.  However, please be advised that this is a medical document.  It is intended as [id number]  communication.  It is written and medical language may contain abbreviations or verbiage that are technical and unfamiliar.  It may appear blunt or direct.  Medical documents are intended to carry relevant information, facts as evident, and the clinical opinion of the practitioner.         [1] No Known Allergies

## 2024-11-15 ENCOUNTER — ROUTINE PRENATAL (OUTPATIENT)
Dept: OBGYN CLINIC | Facility: CLINIC | Age: 24
End: 2024-11-15
Payer: COMMERCIAL

## 2024-11-15 VITALS
HEART RATE: 68 BPM | WEIGHT: 216 LBS | DIASTOLIC BLOOD PRESSURE: 72 MMHG | BODY MASS INDEX: 36 KG/M2 | SYSTOLIC BLOOD PRESSURE: 116 MMHG

## 2024-11-15 DIAGNOSIS — Z11.3 SCREEN FOR STD (SEXUALLY TRANSMITTED DISEASE): ICD-10-CM

## 2024-11-15 DIAGNOSIS — N76.0 VAGINITIS AND VULVOVAGINITIS: ICD-10-CM

## 2024-11-15 DIAGNOSIS — Z34.92 PRENATAL CARE IN SECOND TRIMESTER (HCC): Primary | ICD-10-CM

## 2024-11-15 DIAGNOSIS — Z23 NEED FOR VACCINATION: ICD-10-CM

## 2024-11-15 PROCEDURE — 3078F DIAST BP <80 MM HG: CPT | Performed by: ADVANCED PRACTICE MIDWIFE

## 2024-11-15 PROCEDURE — 90471 IMMUNIZATION ADMIN: CPT | Performed by: ADVANCED PRACTICE MIDWIFE

## 2024-11-15 PROCEDURE — 3074F SYST BP LT 130 MM HG: CPT | Performed by: ADVANCED PRACTICE MIDWIFE

## 2024-11-15 PROCEDURE — 90656 IIV3 VACC NO PRSV 0.5 ML IM: CPT | Performed by: ADVANCED PRACTICE MIDWIFE

## 2024-11-15 NOTE — PATIENT INSTRUCTIONS
Kick Counts  It’s normal to worry about your baby’s health. One way you can know your baby’s doing well is to record the baby’s movements once a day. This is called a kick count.   Remember to take your kick count records to all your appointments with your healthcare provider.  How to count kicks    Time how long it takes you to feel 10 kicks, flutters, swishes, or rolls. Ideally, you want to feel at least 10 movements in 2 hours. You will likely feel 10 movements in less time than that.  Starting at 28 weeks, count your baby's movements daily. Follow your healthcare provider's instructions for kick counting. Here are tips for counting kicks:  Choose a time when the baby is active, such as after a meal.   Sit comfortably or lie on your side.   The first time the baby moves, write down the time.   Count each movement until the baby has moved  10 times. This can take from 20 minutes to 2 hours.   If you haven't felt 10 kicks by the end of the second hour, wait a few hours. Then try again.  Try to do it at the same time each day.  When to call your healthcare provider  Call your healthcare provider  right away if:  You do a couple sets of kick counts during the day and your baby moves fewer than 10 times in 2 hours.  Your baby moves much less often than on the days before.  You haven't felt your baby move all day.  DTT last reviewed this educational content on 2020    © 8650-3004 [employer]. All rights reserved. This information is not intended as a substitute for professional medical care. Always follow your healthcare professional's instructions. Premature Labor    Premature labor ( labor) is when symptoms of labor occur before 37 weeks of pregnancy. (This is 3 weeks before your due date.) Premature labor can lead to premature delivery. This means giving birth to your baby early. Babies need at least 37 weeks of pregnancy for all the organs to develop normally. The earlier the  delivery, the greater the risks to the baby.  In most cases, the cause of premature labor is unknown. But certain factors may make the problem more likely. These include:  History of premature labor with other pregnancies  Smoking  Alcohol or substance abuse  Low pre-pregnancy weight or weight gain during pregnancy  Short time period between pregnancies  Being pregnant with twins, triplets, or more  History of certain types of surgery on the cervix or uterus  Having a short cervix  Certain infections  There are a number of other risk factors. Ask your healthcare provider to help you understand the risk factors specific to your case. Then find out what you can do to control or reduce them.  Contractions are one of the main signs of premature labor. A contraction is different from cramping. It may feel painful and the belly (abdomen) may get hard. It can last from a few seconds to a few minutes. Some women may feel only a sense of pressure in the belly, thighs, rectum, or vagina. Some may feel only the hardening of the uterus without pain or pressure. Or there may be a constant pain in the lower back, which spreads forward toward the belly.Premature labor is often treated with medicines. A hospital stay may be needed. If labor doesn't progress and you and your baby are both healthy, you may be discharged to continue care at home.  Home care  Ask your provider any questions you have. Be certain you understand how to care for yourself at home. Also follow all recommendations given by your healthcare providers.  Learn the signs of premature labor. Watch for these signs when you get home.  Limit or restrict activities as advised. This may include stopping certain physical activities and cutting back hours at work.  Avoid doing strenuous work as directed by your provider. Ask family and friends for help with tasks and support at home, if needed.  Don’t smoke, drink alcohol, or use other harmful substances.  Take steps to  reduce stress.  Report any unusual symptoms to your provider.    Follow-up care  Follow up with your healthcare provider, or as directed. Weekly visits with your provider may be needed.  When to seek medical advice  Call your healthcare provider right away if any of these occur:  Regular or frequent contractions, whether they are painful or not  Pressure in the pelvis  Pressure in the lower belly or mild cramping in your belly with or without diarrhea  Constant low, dull backache  Gush or slow leaking of water from your vagina  Change in vaginal discharge (watery, mucus, or bloody)  Any vaginal bleeding  Decreased movement of your baby  Riana last reviewed this educational content on 2018 [employer]. All rights reserved. This information is not intended as a substitute for professional medical care. Always follow your healthcare professional's instructions.     Understanding Preeclampsia  Preeclampsia is a condition that can happen in pregnancy. It includes high blood pressure (hypertension), swelling, and signs of organ problems. It can show up around week 20 of pregnancy. It often goes away by 12 weeks after you give birth. It can lead to serious health risks for you and your baby. During your pregnancy, your healthcare provider will watch your blood pressure.      Your blood pressure will be monitored regularly throughout your pregnancy to help check for preeclampsia.     Dangers of preeclampsia   If not treated, preeclampsia can cause problems for you and your baby. The placenta is the organ that nourishes your baby. It may tear away from the wall of the uterus. This can put the baby at risk for health problems (fetal distress). It can put the baby at risk for  birth. Preeclampsia can also cause these health problems in you:   Kidney failure or other organ damage  Seizures  Stroke  Who’s at risk for preeclampsia?   No one knows what causes preeclampsia. It can happen  in any pregnant person. But there are things that increase your risk. You may need to take a daily low dose of aspirin if you are at risk for preeclampsia.   You’re at higher risk for preeclampsia if you have any of these:  Diabetes  High blood pressure  Obesity  Kidney disease  Autoimmune disease such as lupus  A family history of preeclampsia  You’re at higher risk if any of these apply to you:  This is your first pregnancy  You are having twins or more  You’re under age 18 or over age 40  You used in vitro fertilization  You are Black  And you’re at higher risk if you had any of these in a past pregnancy:   Preeclampsia  Intrauterine growth retardation (IUGR)   birth  Placental abruption  Fetal death  Symptoms  A common symptom of preeclampsia is high blood pressure. Other symptoms may include:   Fast weight gain  Protein in your urine  Headache  Belly (abdominal) pain on your right side  Vision problems such as flashes or spots  Swelling (edema) in your face or hands (this often happens near the end of a normal pregnancy)  Tests you may have   Your healthcare provider will want to check your blood pressure. This will need to be done often in your pregnancy. If your blood pressure is high, you may have these tests:   Urine tests to look for protein  Blood tests to confirm preeclampsia  Fetal monitoring to make sure that your baby is healthy  Treating preeclampsia   Preeclampsia almost always ends soon after you give birth. Until then, your healthcare provider can help you manage it.   If your symptoms are mild, you may to:     Limit your activity  Rest in bed  Not do heavy lifting    If your symptoms are severe, you will stay in the hospital. Treatment here may include:   Limits to your activity. This is to help control your blood pressure. You should not lift anything heavy. You will need to spend 8 hours a day lying down with your feet up.  Magnesium IV (intravenous) drip. This is done during labor. It's  to prevent seizures  Induced labor or  section. Birth of the baby is considered the cure for preeclampsia.  When to call your healthcare provider   Call your healthcare provider if your symptoms start quickly or are severe. This includes swelling, weight gain, or other symptoms. Some cases of preeclampsia are more severe than others. Your symptoms also may change or get worse as you get closer to your due date.   Once you give birth   In most cases, preeclampsia goes away on its own soon after you give birth. This is often by the 12th week after you deliver. Within days after you give birth, your blood pressure, swelling, and other symptoms should get better. But for some people, problems from preeclampsia can continue after birth.   Postpartum preeclampsia   Preeclampsia that starts after birth is rare. There are 2 types:   Postpartum preeclampsia. This may start in the first 48 hours after birth.  Late-onset preeclampsia. This starts more than 48 hours after birth.  Both of these types are rare. But call your healthcare provider right away if you have symptoms of preeclampsia after you give birth.   Riana last reviewed this educational content on 10/1/2021    © 1656-2437 [employer]. All rights reserved. This information is not intended as a substitute for professional medical care. Always follow your healthcare professional's instructions.

## 2024-11-15 NOTE — PROGRESS NOTES
Here for ANTONIO visit.      Patient's last menstrual period was 2024 (exact date). 3/7/2025, by Ultrasound 24w0d     Baby active. Denies contractions, LOF or bleeding. Had some mucous discharge for 2 days after intercourse last week. Denies any itching or odor. Sepc exam: scant yellow discharge noted. Cultures obtained. Cervix appears closed & long.     Labs: 3rd tri labs ordered  Ultrasound: - 22 1/ wks, posterior placenta, 3VC, Fingers not adequately visualized, otherwise normal anatomy  Flu vaccine: Per nurse today    Concern over weight loss. Denies any N & V. States eating less meat. Was in [place] and noted lost weight when came back. Updated her PPW and noted 10# wt loss in pregnancy. 3# since last visit. Recommend 3 meals with snacks in between. Focus on healthy fats, fruits, veges and work on increasing protein with legumes, eggs, greek yogurt, etc. Continue to monitor weight. Will need growth ultrasound @ 32 wks      ICD-10-CM    1. Prenatal care in second trimester ([facility])  Z34.92         Importance of active FM,  warning signs and signs PTL reviewed.

## 2024-11-16 LAB
BV BACTERIA DNA VAG QL NAA+PROBE: NEGATIVE
C GLABRATA DNA VAG QL NAA+PROBE: NEGATIVE
C KRUSEI DNA VAG QL NAA+PROBE: NEGATIVE
CANDIDA DNA VAG QL NAA+PROBE: NEGATIVE
T VAGINALIS DNA VAG QL NAA+PROBE: NEGATIVE

## 2024-11-18 LAB
C TRACH DNA SPEC QL NAA+PROBE: NEGATIVE
N GONORRHOEA DNA SPEC QL NAA+PROBE: NEGATIVE

## 2024-12-14 ENCOUNTER — LAB ENCOUNTER (OUTPATIENT)
Dept: LAB | Facility: REFERENCE LAB | Age: 24
End: 2024-12-14
Attending: ADVANCED PRACTICE MIDWIFE
Payer: COMMERCIAL

## 2024-12-14 DIAGNOSIS — Z34.92 PRENATAL CARE IN SECOND TRIMESTER (HCC): ICD-10-CM

## 2024-12-14 LAB
DEPRECATED RDW RBC AUTO: 41 FL (ref 35.1–46.3)
ERYTHROCYTE [DISTWIDTH] IN BLOOD BY AUTOMATED COUNT: 12.4 % (ref 11–15)
GLUCOSE 1H P GLC SERPL-MCNC: 156 MG/DL
HCT VFR BLD AUTO: 35.9 %
HGB BLD-MCNC: 11.4 G/DL
MCH RBC QN AUTO: 28.9 PG (ref 26–34)
MCHC RBC AUTO-ENTMCNC: 31.8 G/DL (ref 31–37)
MCV RBC AUTO: 90.9 FL
PLATELET # BLD AUTO: 384 10(3)UL (ref 150–450)
RBC # BLD AUTO: 3.95 X10(6)UL
T PALLIDUM AB SER QL IA: NONREACTIVE
WBC # BLD AUTO: 13.4 X10(3) UL (ref 4–11)

## 2024-12-14 PROCEDURE — 86780 TREPONEMA PALLIDUM: CPT | Performed by: ADVANCED PRACTICE MIDWIFE

## 2024-12-14 PROCEDURE — 82950 GLUCOSE TEST: CPT | Performed by: ADVANCED PRACTICE MIDWIFE

## 2024-12-14 PROCEDURE — 87389 HIV-1 AG W/HIV-1&-2 AB AG IA: CPT | Performed by: ADVANCED PRACTICE MIDWIFE

## 2024-12-14 PROCEDURE — 85027 COMPLETE CBC AUTOMATED: CPT | Performed by: ADVANCED PRACTICE MIDWIFE

## 2024-12-16 ENCOUNTER — ROUTINE PRENATAL (OUTPATIENT)
Dept: OBGYN CLINIC | Facility: CLINIC | Age: 24
End: 2024-12-16
Payer: COMMERCIAL

## 2024-12-16 ENCOUNTER — TELEPHONE (OUTPATIENT)
Dept: OBGYN CLINIC | Facility: CLINIC | Age: 24
End: 2024-12-16

## 2024-12-16 VITALS
HEART RATE: 116 BPM | BODY MASS INDEX: 37 KG/M2 | DIASTOLIC BLOOD PRESSURE: 81 MMHG | WEIGHT: 224 LBS | SYSTOLIC BLOOD PRESSURE: 133 MMHG

## 2024-12-16 DIAGNOSIS — Z34.93 PRENATAL CARE IN THIRD TRIMESTER (HCC): Primary | ICD-10-CM

## 2024-12-16 DIAGNOSIS — R73.09 ELEVATED GLUCOSE: Primary | ICD-10-CM

## 2024-12-16 PROCEDURE — 90715 TDAP VACCINE 7 YRS/> IM: CPT | Performed by: ADVANCED PRACTICE MIDWIFE

## 2024-12-16 PROCEDURE — 3079F DIAST BP 80-89 MM HG: CPT | Performed by: ADVANCED PRACTICE MIDWIFE

## 2024-12-16 PROCEDURE — 3075F SYST BP GE 130 - 139MM HG: CPT | Performed by: ADVANCED PRACTICE MIDWIFE

## 2024-12-16 PROCEDURE — 90471 IMMUNIZATION ADMIN: CPT | Performed by: ADVANCED PRACTICE MIDWIFE

## 2024-12-16 NOTE — PROGRESS NOTES
TDap today. Failed 1 hour has 3 hour scheduled. RTC 2 weeks. Feels mood is managed ok on sertraline. Reviewed danger signs.

## 2024-12-16 NOTE — TELEPHONE ENCOUNTER
Lab results reviewed with patient. Number to centralized scheduling provided. All questions answered.

## 2024-12-20 ENCOUNTER — LABORATORY ENCOUNTER (OUTPATIENT)
Dept: LAB | Facility: HOSPITAL | Age: 24
End: 2024-12-20
Attending: ADVANCED PRACTICE MIDWIFE
Payer: COMMERCIAL

## 2024-12-20 DIAGNOSIS — R73.09 ELEVATED GLUCOSE: ICD-10-CM

## 2024-12-20 LAB
EST. AVERAGE GLUCOSE BLD GHB EST-MCNC: 105 MG/DL (ref 68–126)
GLUCOSE 1H P GLC SERPL-MCNC: 155 MG/DL
GLUCOSE 2H P GLC SERPL-MCNC: 137 MG/DL
GLUCOSE 3H P GLC SERPL-MCNC: 64 MG/DL (ref 70–140)
GLUCOSE P FAST SERPL-MCNC: 93 MG/DL
HBA1C MFR BLD: 5.3 % (ref ?–5.7)

## 2024-12-20 PROCEDURE — 83036 HEMOGLOBIN GLYCOSYLATED A1C: CPT | Performed by: ADVANCED PRACTICE MIDWIFE

## 2025-01-13 ENCOUNTER — HOSPITAL ENCOUNTER (OUTPATIENT)
Dept: PERINATAL CARE | Facility: HOSPITAL | Age: 25
End: 2025-01-13
Attending: OBSTETRICS & GYNECOLOGY
Payer: COMMERCIAL

## 2025-01-13 ENCOUNTER — HOSPITAL ENCOUNTER (OUTPATIENT)
Dept: PERINATAL CARE | Facility: HOSPITAL | Age: 25
Discharge: HOME | End: 2025-01-13
Attending: OBSTETRICS & GYNECOLOGY
Payer: COMMERCIAL

## 2025-01-13 VITALS
DIASTOLIC BLOOD PRESSURE: 75 MMHG | SYSTOLIC BLOOD PRESSURE: 119 MMHG | BODY MASS INDEX: 36 KG/M2 | HEART RATE: 103 BPM | WEIGHT: 217 LBS

## 2025-01-13 DIAGNOSIS — O99.210 OBESITY IN PREGNANCY (HCC): ICD-10-CM

## 2025-01-13 DIAGNOSIS — E66.09 OTHER OBESITY DUE TO EXCESS CALORIES AFFECTING PREGNANCY IN THIRD TRIMESTER (HCC): Primary | ICD-10-CM

## 2025-01-13 DIAGNOSIS — O99.213 OTHER OBESITY DUE TO EXCESS CALORIES AFFECTING PREGNANCY IN THIRD TRIMESTER (HCC): Primary | ICD-10-CM

## 2025-01-13 PROCEDURE — 76816 OB US FOLLOW-UP PER FETUS: CPT | Performed by: OBSTETRICS & GYNECOLOGY

## 2025-01-13 PROCEDURE — 76819 FETAL BIOPHYS PROFIL W/O NST: CPT

## 2025-01-13 NOTE — PROGRESS NOTES
Outpatient Maternal-Fetal Medicine Consultation    Dear  Ms. Mckeon    Thank you for requesting ultrasound evaluation and maternal fetal medicine consultation on your patient Monet Blanc.  As you are aware she is a 24 year old female  with a lopez pregnancy and an Estimated Date of Delivery: 3/7/25.  A maternal-fetal medicine f/u is today.   Her prenatal records and labs were reviewed.    Feeling well.  Passed 3hr gtt.  ROS    HISTORY  OB History    Para Term  AB Living   1 0 0 0 0 0   SAB IAB Ectopic Multiple Live Births   0 0 0 0 0      # Outcome Date GA Lbr Markus/2nd Weight Sex Type Anes PTL Lv   1 Current                Allergies:  Allergies[1]   Current Meds:  Current Outpatient Medications   Medication Sig Dispense Refill    sertraline 50 MG Oral Tab Take 1.5 tablets (75 mg total) by mouth daily. 45 tablet 1    Prenatal Vit-Fe Fumarate-FA (MULTI PRENATAL) 27-0.8 MG Oral Tab Take by mouth.      aspirin (ASPIRIN LOW DOSE) 81 MG Oral Chew Tab Chew 1.5 tablets (121.5 mg total) by mouth daily. 60 tablet 2        HISTORY:  Past Medical History:    Acute flank pain    Acute tonsillitis, unspecified    Amenorrhea    Constipation    Decorative tattoo      No past surgical history on file.   Family History   Problem Relation Age of Onset    No Known Problems Father     No Known Problems Mother     Diabetes Maternal Grandmother     Heart Disease Maternal Grandmother     Heart Disorder Other         Sidden cardiac death-no close relative      Social History     Socioeconomic History    Marital status: Single   Tobacco Use    Smoking status: Never    Smokeless tobacco: Never   Substance and Sexual Activity    Alcohol use: No    Drug use: No   Other Topics Concern    Caffeine Concern Yes     Comment: Sod     Social Drivers of Health     Financial Resource Strain: Low Risk  (2024)    Financial Resource Strain     Difficulty of Paying Living Expenses: Not very hard     Med  Affordability: No   Food Insecurity: No Food Insecurity (8/13/2024)    Food Insecurity     Food Insecurity: Never true   Transportation Needs: No Transportation Needs (8/13/2024)    Transportation Needs     Lack of Transportation: No   Stress: No Stress Concern Present (8/13/2024)    Stress     Feeling of Stress : No   Housing Stability: Low Risk  (8/13/2024)    Housing Stability     Housing Instability: No          PHYSICAL EXAMINATION:  /75   Pulse 103   Wt 217 lb (98.4 kg)   LMP 05/25/2024 (Exact Date)   BMI 36.11 kg/m²   Physical Exam  Constitutional:       Appearance: Normal appearance.   Abdominal:      Palpations: Abdomen is soft.      Tenderness: There is no abdominal tenderness.   Neurological:      Mental Status: She is alert.   Psychiatric:         Mood and Affect: Mood normal.         Behavior: Behavior normal.           OBSTETRIC ULTRASOUND  The patient had a follow-up growth and BPP ultrasound today which revealed normal interval fetal growth and a BPP of 8/8.   Ultrasound Findings:  Single IUP in cephalic presentation.    Placenta is posterior.   A 3 vessel cord is noted.  Cardiac activity is present at 149 bpm  EFW 1930 g ( 4 lb 4 oz); 43%.    DONITA is  14.1 cm.  MVP is 4.5 cm  BPP is 8/8.     The fetal measurements are consistent with established EDC. No gross ultrasound evidence of structural abnormalities are seen today. The patient understands that ultrasound cannot rule out all structural and chromosomal abnormalities.     See PACS/Imaging Tab For Complete Ultrasound Report  I interpreted the results and reviewed them with the patient.    DISCUSSION  During her visit we discussed and reviewed the following issues:  OBESITY:  Her BMI prior to pregnancy was 36 Please see previous [facility] detailed discussion.     Prevention of Preeclampsia   Please see previous [facility] detailed discussion.            We discussed what is known about the safety of selective serotonin receptor inhibitors (SSRI) in  pregnancy.     Please see previous MFM detailed discussion.     GLUCOSE 1HR OB   Date Value Ref Range Status   12/14/2024 156 (H) See comment mg/dL Final     Comment:     If the plasma glucose level measured after 1 hour is >=130, 135 or 140 mg/dl proceed to \"Glucose Tolerance, 100 gm (0 hr, 1 hr, 2hr, 3hr), Gestational (ADA)\" test on a separate day, as clinically indicated.         Lab Results   Component Value Date    GLUFG 93 12/20/2024    GLU1G 155 12/20/2024    GLU2G 137 12/20/2024    GLU3G 64 (L) 12/20/2024      Signs and symptoms of preeclampsia were reviewed.      IMPRESSION:  IUP at 32w3d   Obesity  Anxiety in pregnancy    RECOMMENDATIONS:  Continue care with Ms. Mckeon  Weekly NSTs at 36 weeks  11-20 lb weight gain for pregnancy  Low dose aspirin  mg daily      Thank you for allowing me to participate in the care of your patient.  Please do not hesitate to contact me if additional questions or concerns arise.      Neeta Bone M.D.    20minutes spent in review of records, patient consultation, documentation and coordination of care.  The relevant clinical matter(s) are summarized above.     Note to patient and family  The 21st Century Cures Act makes medical notes available to patients in the interest of transparency.  However, please be advised that this is a medical document.  It is intended as [id number]-ji-[id number] communication.  It is written and medical language may contain abbreviations or verbiage that are technical and unfamiliar.  It may appear blunt or direct.  Medical documents are intended to carry relevant information, facts as evident, and the clinical opinion of the practitioner.       [1] No Known Allergies

## 2025-01-15 ENCOUNTER — ROUTINE PRENATAL (OUTPATIENT)
Dept: OBGYN CLINIC | Facility: CLINIC | Age: 25
End: 2025-01-15
Payer: COMMERCIAL

## 2025-01-15 VITALS
SYSTOLIC BLOOD PRESSURE: 121 MMHG | HEIGHT: 65 IN | HEART RATE: 75 BPM | WEIGHT: 219.5 LBS | DIASTOLIC BLOOD PRESSURE: 74 MMHG | BODY MASS INDEX: 36.57 KG/M2

## 2025-01-15 DIAGNOSIS — Z34.93 PRENATAL CARE IN THIRD TRIMESTER (HCC): Primary | ICD-10-CM

## 2025-01-15 PROCEDURE — 3008F BODY MASS INDEX DOCD: CPT | Performed by: ADVANCED PRACTICE MIDWIFE

## 2025-01-15 PROCEDURE — 3074F SYST BP LT 130 MM HG: CPT | Performed by: ADVANCED PRACTICE MIDWIFE

## 2025-01-15 PROCEDURE — 3078F DIAST BP <80 MM HG: CPT | Performed by: ADVANCED PRACTICE MIDWIFE

## 2025-01-15 NOTE — PROGRESS NOTES
Active baby. Slipped and fell on ice 2 days ago. Normal growth ultrasound on same day. Hit her left hip and wrist after falling on ice. Taking baby ASA - reviewed rational. TDAP completed. RSV today. Reviewed danger signs.

## 2025-01-16 PROCEDURE — 3078F DIAST BP <80 MM HG: CPT | Performed by: ADVANCED PRACTICE MIDWIFE

## 2025-01-16 PROCEDURE — 3074F SYST BP LT 130 MM HG: CPT | Performed by: ADVANCED PRACTICE MIDWIFE

## 2025-01-16 PROCEDURE — 90678 RSV VACC PREF BIVALENT IM: CPT | Performed by: ADVANCED PRACTICE MIDWIFE

## 2025-01-16 PROCEDURE — 3008F BODY MASS INDEX DOCD: CPT | Performed by: ADVANCED PRACTICE MIDWIFE

## 2025-01-16 PROCEDURE — 90471 IMMUNIZATION ADMIN: CPT | Performed by: ADVANCED PRACTICE MIDWIFE

## 2025-01-28 ENCOUNTER — OFFICE VISIT (OUTPATIENT)
Dept: FAMILY MEDICINE CLINIC | Facility: CLINIC | Age: 25
End: 2025-01-28
Payer: COMMERCIAL

## 2025-01-28 VITALS
WEIGHT: 225 LBS | SYSTOLIC BLOOD PRESSURE: 113 MMHG | BODY MASS INDEX: 37.49 KG/M2 | HEART RATE: 82 BPM | DIASTOLIC BLOOD PRESSURE: 71 MMHG | HEIGHT: 65 IN

## 2025-01-28 DIAGNOSIS — J01.90 ACUTE RHINOSINUSITIS: Primary | ICD-10-CM

## 2025-01-28 PROCEDURE — 3008F BODY MASS INDEX DOCD: CPT | Performed by: FAMILY MEDICINE

## 2025-01-28 PROCEDURE — 3074F SYST BP LT 130 MM HG: CPT | Performed by: FAMILY MEDICINE

## 2025-01-28 PROCEDURE — 99213 OFFICE O/P EST LOW 20 MIN: CPT | Performed by: FAMILY MEDICINE

## 2025-01-28 PROCEDURE — 3078F DIAST BP <80 MM HG: CPT | Performed by: FAMILY MEDICINE

## 2025-01-28 RX ORDER — ONDANSETRON 4 MG/1
TABLET, ORALLY DISINTEGRATING ORAL
COMMUNITY
Start: 2025-01-05

## 2025-01-28 NOTE — PROGRESS NOTES
Blood pressure 113/71, pulse 82, height 5' 5\" (1.651 m), weight 225 lb (102.1 kg), last menstrual period 05/25/2024, not currently breastfeeding.      Patient presents today following up for nasal congestion that is green also headaches.  She is pregnant due in about 6 weeks.  Otherwise has intermittent tailbone pain from a fall she sustained about a year ago.    No pain at this time.    Occasional coughing.  No fever.  She denies any vaginal fluid leakage.  She denies vaginal bleeding.  Baby is moving.    Objective    Lungs clear to auscultation no rales rhonchi or wheezes    Throat erythematous no exudate    Uterus is gravid    Assessment sinus infection with pregnancy also intermittent tailbone pain    Plan Augmentin prescription follow-up if no improvement

## 2025-02-05 ENCOUNTER — TELEPHONE (OUTPATIENT)
Dept: FAMILY MEDICINE CLINIC | Facility: CLINIC | Age: 25
End: 2025-02-05

## 2025-02-12 ENCOUNTER — APPOINTMENT (OUTPATIENT)
Dept: OBGYN CLINIC | Facility: HOSPITAL | Age: 25
End: 2025-02-12
Payer: COMMERCIAL

## 2025-02-12 ENCOUNTER — HOSPITAL ENCOUNTER (OUTPATIENT)
Facility: HOSPITAL | Age: 25
Discharge: HOME | End: 2025-02-12
Attending: ADVANCED PRACTICE MIDWIFE | Admitting: OBSTETRICS & GYNECOLOGY
Payer: COMMERCIAL

## 2025-02-12 ENCOUNTER — NST DOCUMENTATION (OUTPATIENT)
Dept: OBGYN CLINIC | Facility: CLINIC | Age: 25
End: 2025-02-12

## 2025-02-12 VITALS
RESPIRATION RATE: 16 BRPM | TEMPERATURE: 98 F | HEART RATE: 80 BPM | SYSTOLIC BLOOD PRESSURE: 116 MMHG | DIASTOLIC BLOOD PRESSURE: 85 MMHG

## 2025-02-12 DIAGNOSIS — O99.213 OBESITY AFFECTING PREGNANCY IN THIRD TRIMESTER, UNSPECIFIED OBESITY TYPE (HCC): Primary | ICD-10-CM

## 2025-02-12 PROCEDURE — 59025 FETAL NON-STRESS TEST: CPT

## 2025-02-12 PROCEDURE — 59025 FETAL NON-STRESS TEST: CPT | Performed by: ADVANCED PRACTICE MIDWIFE

## 2025-02-12 NOTE — PROGRESS NOTES
Pt is a 24 year old female admitted to TR2/TR2-A.     Chief Complaint   Patient presents with    Non-stress Test     Scheduled NST for high BMI      Pt is  36w5d intra-uterine pregnancy.  History obtained, pt. Oriented to room, staff, and plan of care.

## 2025-02-13 NOTE — NST
Nonstress Test   Patient: Monet Blanc    Gestation: 36w5d    Diagnosis from order:  Obesity in pregnancy    Problem List:   Patient Active Problem List   Diagnosis    Anxiety and depression    ADHD    Maternal varicella, non-immune (HCC)    Obesity in pregnancy (HCC)       NST:Reactive        2025   NST DOCUMENTATION   Variability 6-25 BPM   Accelerations Yes   Decelerations None   Baseline 140 BPM   Uterine Irritability No   Contractions Not present   Acoustic Stimulator No   Nonstress Test Interpretation Reactive   Nonstress Test Second Interpretation Reactive   FHR Category Category I   Comments pt. is 36 5/7 wks. here for scheduled NST for high BMI.  NST reactive.  LUCRECIA Plascencia notified.  order to discharge home   NST Completed by Issa RNc   Disposition home    Testing Plan Weekly NST   Provider Notified LUCRECIA Plascencia CNM         I agree with the above evaluation. NST completed.  Jazmine Plascencia CNM  2025  9:22 PM

## 2025-02-19 ENCOUNTER — HOSPITAL ENCOUNTER (OUTPATIENT)
Facility: HOSPITAL | Age: 25
Discharge: HOME | End: 2025-02-19
Attending: ADVANCED PRACTICE MIDWIFE | Admitting: OBSTETRICS & GYNECOLOGY
Payer: COMMERCIAL

## 2025-02-19 ENCOUNTER — APPOINTMENT (OUTPATIENT)
Dept: OBGYN CLINIC | Facility: HOSPITAL | Age: 25
End: 2025-02-19
Payer: COMMERCIAL

## 2025-02-19 ENCOUNTER — NST DOCUMENTATION (OUTPATIENT)
Dept: OBGYN CLINIC | Facility: CLINIC | Age: 25
End: 2025-02-19

## 2025-02-19 VITALS — DIASTOLIC BLOOD PRESSURE: 67 MMHG | HEART RATE: 71 BPM | SYSTOLIC BLOOD PRESSURE: 139 MMHG

## 2025-02-19 DIAGNOSIS — O99.213 OBESITY AFFECTING PREGNANCY IN THIRD TRIMESTER, UNSPECIFIED OBESITY TYPE (HCC): Primary | ICD-10-CM

## 2025-02-19 DIAGNOSIS — Z34.90 PREGNANCY (HCC): ICD-10-CM

## 2025-02-19 PROCEDURE — 59025 FETAL NON-STRESS TEST: CPT | Performed by: ADVANCED PRACTICE MIDWIFE

## 2025-02-19 PROCEDURE — 59025 FETAL NON-STRESS TEST: CPT

## 2025-02-19 NOTE — PROGRESS NOTES
Pt is a 24 year old female admitted to TR4/TR4-A.     Chief Complaint   Patient presents with    Non-stress Test     NST for high BMI      Pt is  37w5d intra-uterine pregnancy.  History obtained, consents signed. Oriented to room, staff, and plan of care.

## 2025-02-19 NOTE — NST
Nonstress Test   Patient: Monet Mares    Gestation: 37w5d    Diagnosis from order:  Obesity in pregnancy    Problem List:   Patient Active Problem List   Diagnosis    Anxiety and depression    ADHD    Maternal varicella, non-immune (HCC)    Obesity in pregnancy (HCC)       NST: Reactive        2025   NST DOCUMENTATION   Variability 6-25 BPM   Accelerations Yes   Decelerations None   Baseline 140 BPM   Uterine Irritability Yes   Contractions Not present   Acoustic Stimulator No   Nonstress Test Interpretation Reactive   Nonstress Test Second Interpretation Reactive   FHR Category Category I   Comments NST for High BMI. NST reactive and reported to ANA Plascencia CNM. Pt kulwinder hoem with labor precautions and kick counts. Pt instructedt o Make appointment to be seen in [facility] ASAP. Pt with verbalized understanding.   NST Completed by A Yobani TEJADA   Disposition home    Testing Plan Weekly NST   Provider Notified ANA Plascencia CNM         I agree with the above evaluation. NST completed.  Jazmine Plascencia CNM  2025  2:31 PM

## 2025-02-20 ENCOUNTER — ROUTINE PRENATAL (OUTPATIENT)
Dept: OBGYN CLINIC | Facility: CLINIC | Age: 25
End: 2025-02-20
Payer: COMMERCIAL

## 2025-02-20 VITALS
BODY MASS INDEX: 38.05 KG/M2 | HEIGHT: 65 IN | WEIGHT: 228.38 LBS | HEART RATE: 99 BPM | DIASTOLIC BLOOD PRESSURE: 77 MMHG | SYSTOLIC BLOOD PRESSURE: 119 MMHG

## 2025-02-20 DIAGNOSIS — Z11.3 SCREEN FOR STD (SEXUALLY TRANSMITTED DISEASE): ICD-10-CM

## 2025-02-20 DIAGNOSIS — Z36.89 NST (NON-STRESS TEST) REACTIVE (HCC): ICD-10-CM

## 2025-02-20 DIAGNOSIS — O99.213 OBESITY AFFECTING PREGNANCY IN THIRD TRIMESTER, UNSPECIFIED OBESITY TYPE (HCC): ICD-10-CM

## 2025-02-20 DIAGNOSIS — Z34.93 PRENATAL CARE IN THIRD TRIMESTER (HCC): Primary | ICD-10-CM

## 2025-02-20 PROBLEM — Z34.90 PREGNANT (HCC): Status: ACTIVE | Noted: 2025-02-20

## 2025-02-20 PROCEDURE — 3008F BODY MASS INDEX DOCD: CPT | Performed by: ADVANCED PRACTICE MIDWIFE

## 2025-02-20 PROCEDURE — 3078F DIAST BP <80 MM HG: CPT | Performed by: ADVANCED PRACTICE MIDWIFE

## 2025-02-20 PROCEDURE — 3074F SYST BP LT 130 MM HG: CPT | Performed by: ADVANCED PRACTICE MIDWIFE

## 2025-02-20 PROCEDURE — 59025 FETAL NON-STRESS TEST: CPT | Performed by: ADVANCED PRACTICE MIDWIFE

## 2025-02-20 NOTE — PROGRESS NOTES
Monet, , is at 37w6d, here for her ANTONIO visit and NST  Currently, she is feeling well. Denies 3rd trimester danger signs. Having some BH contractions but nothing consistent. Endorses active fetus . Consents to SVE    Vital signs and weight reviewed    Flu shot, COVID, TDAP, RSV vaccine status: TDAP and RSV completed     Birth plan reviewed:    Prior birth experiences/outcome:G1  ASA stop / anticoagulation switch: stopped ASA last week  Support: mother and boyfriend-[place]  Pain management: desires epidural  Vitamin K: okay  Erythromycin ointment: okay  Placenta: discard  Circumcision: n/a baby girl  Peds: [facility]  Housing/car seat: stable/has, lives with mom and boyfriend    Assessment/Plan:   -GBS and GC/C collected today  -NST weekly   -NST reactive, see NST note  -Anticipate labor  Next visit: 1 week    Reviewed:   Prenatal visit schedule  3rd trimester precautions and expectations  Kick counts  Danger signs  Labor precautions and signs of labor reviewed       Pt verbalized understanding. All questions answered. No barriers to learning identified    TRICIA Gruber under direct supervision and in collaboration with Gail Mckeon CNM

## 2025-02-20 NOTE — PROCEDURES
Nonstress Test       Patient: Monet Blanc    Gestation: 37w6d    Diagnosis from order:  Obesity    Problem List:   Patient Active Problem List   Diagnosis    Anxiety and depression    ADHD    Maternal varicella, non-immune (HCC)    Obesity in pregnancy (HCC)    Pregnant (HCC)        NST:  NST completed.    Fetal Surveillance:   Fetal Heart Tones (FHTs): 130 with moderate variability, accelerations present, decelerations absent  Uterine contractions (Ucs): irritability    [  X  ]  Reactive: Discharged home, follow-up plan RTC 1 week      Gail Mckeon CNM, 02/20/25, 12:19 PM

## 2025-02-21 LAB
C TRACH DNA SPEC QL NAA+PROBE: NEGATIVE
N GONORRHOEA DNA SPEC QL NAA+PROBE: NEGATIVE

## 2025-02-22 LAB — GROUP B STREP BY PCR FOR PCR OVT: POSITIVE

## 2025-02-26 ENCOUNTER — ROUTINE PRENATAL (OUTPATIENT)
Dept: OBGYN CLINIC | Facility: CLINIC | Age: 25
End: 2025-02-26
Payer: COMMERCIAL

## 2025-02-26 VITALS
SYSTOLIC BLOOD PRESSURE: 131 MMHG | WEIGHT: 231.38 LBS | BODY MASS INDEX: 38.55 KG/M2 | HEIGHT: 65 IN | HEART RATE: 88 BPM | DIASTOLIC BLOOD PRESSURE: 80 MMHG

## 2025-02-26 DIAGNOSIS — Z34.93 PRENATAL CARE IN THIRD TRIMESTER (HCC): Primary | ICD-10-CM

## 2025-02-26 DIAGNOSIS — O99.210 OBESITY IN PREGNANCY (HCC): ICD-10-CM

## 2025-02-26 PROBLEM — O99.820 GROUP B STREPTOCOCCAL CARRIAGE COMPLICATING PREGNANCY (HCC): Status: ACTIVE | Noted: 2025-02-26

## 2025-02-26 PROCEDURE — 3079F DIAST BP 80-89 MM HG: CPT | Performed by: ADVANCED PRACTICE MIDWIFE

## 2025-02-26 PROCEDURE — 3008F BODY MASS INDEX DOCD: CPT | Performed by: ADVANCED PRACTICE MIDWIFE

## 2025-02-26 PROCEDURE — 3075F SYST BP GE 130 - 139MM HG: CPT | Performed by: ADVANCED PRACTICE MIDWIFE

## 2025-02-26 PROCEDURE — 59025 FETAL NON-STRESS TEST: CPT | Performed by: ADVANCED PRACTICE MIDWIFE

## 2025-02-26 NOTE — PROGRESS NOTES
Monet, , is at 38w5d, here for her ANTONIO visit.  Currently, she is feeling well. Denies 3rd trimester danger signs. Endorses active fetus. Having some intermittent contractions but nothing uncomfortable. Desires SVE today.     Vital signs and weight reviewed    GBS positive. Birth plan already reviewed    Assessment/Plan:   -Discussed elective IOL if patient desires at 39 weeks. She is unsure at this time  -Aware of GBS positive and need for antibiotics in labor  -Discussed NST and DONITA at 41 weeks if undelivered  -NST completed in office today    Next visit:   -ANTONIO 1 week    Reviewed:   Prenatal visit schedule  3rd trimester precautions and expectations  Kick counts  Danger signs  Labor precautions  Current L&D policies:   Three visitors plus   Nitrous available  No water birth available at this time      Pt verbalized understanding. All questions answered. No barriers to learning identified     TRICIA Gruber under direct supervision and in collaboration with Gail Mckeon CNM

## 2025-02-26 NOTE — PROCEDURES
Nonstress Test       Patient: Monet Blanc    Gestation: 38w5d    Diagnosis from order:  Obesity in pregnancy    Problem List:   Patient Active Problem List   Diagnosis    Anxiety and depression    ADHD    Maternal varicella, non-immune (HCC)    Obesity in pregnancy (HCC)    Pregnant (HCC)    Group B streptococcal carriage complicating pregnancy (HCC)        NST:  NST completed.    Fetal Surveillance:   Fetal Heart Tones (FHTs): 130 with moderate variability, accelerations > 2 15 x 15, decelerations none  Uterine contractions (Ucs): none    [  X  ]  Reactive: Discharged home, follow-up plan ANTONIO visit in 1 week    TRICIA Gruber under direct supervision and in collaboration with Gail Mckeon CNM  I personally performed the patient's exam and medical decision making on this date of service.  I was physically present in the room for the performance of the E/M service.  I have reviewed the ANIKET student's documentation and findings including history, Exam, and Medical Decision Making, edited the document for accuracy and verify that it represents the clinical findings and services performed.     Norma Travis, 02/26/25, 3:44 PM

## 2025-03-06 ENCOUNTER — ROUTINE PRENATAL (OUTPATIENT)
Dept: OBGYN CLINIC | Facility: CLINIC | Age: 25
End: 2025-03-06
Payer: MEDICAID

## 2025-03-06 ENCOUNTER — HOSPITAL ENCOUNTER (OUTPATIENT)
Facility: HOSPITAL | Age: 25
Discharge: HOME | End: 2025-03-06
Attending: ADVANCED PRACTICE MIDWIFE | Admitting: OBSTETRICS & GYNECOLOGY
Payer: MEDICAID

## 2025-03-06 VITALS
SYSTOLIC BLOOD PRESSURE: 129 MMHG | HEART RATE: 79 BPM | DIASTOLIC BLOOD PRESSURE: 79 MMHG | WEIGHT: 234 LBS | BODY MASS INDEX: 39 KG/M2

## 2025-03-06 VITALS
DIASTOLIC BLOOD PRESSURE: 81 MMHG | HEART RATE: 76 BPM | TEMPERATURE: 98 F | SYSTOLIC BLOOD PRESSURE: 125 MMHG | RESPIRATION RATE: 16 BRPM

## 2025-03-06 DIAGNOSIS — Z34.93 PRENATAL CARE IN THIRD TRIMESTER (HCC): Primary | ICD-10-CM

## 2025-03-06 PROCEDURE — 3078F DIAST BP <80 MM HG: CPT | Performed by: ADVANCED PRACTICE MIDWIFE

## 2025-03-06 PROCEDURE — 99212 OFFICE O/P EST SF 10 MIN: CPT

## 2025-03-06 PROCEDURE — 59025 FETAL NON-STRESS TEST: CPT

## 2025-03-06 PROCEDURE — 3074F SYST BP LT 130 MM HG: CPT | Performed by: ADVANCED PRACTICE MIDWIFE

## 2025-03-06 RX ORDER — ACETAMINOPHEN 500 MG
1000 TABLET ORAL EVERY 6 HOURS PRN
COMMUNITY
End: 2025-03-12

## 2025-03-06 NOTE — PROGRESS NOTES
Subjective  Monet Blanc is a 24 year old F  currently 39w6d d/b 11 week ultrasound who presents for ANTONIO.    Feeling well today. Reports good fetal movement. Denies vaginal bleeding, loss of fluid, and uterine contractions.   Desires cervical exam.     Objective  Vitals:    25 1207   BP: 129/79   Pulse: 79     Assessment/Plan  Monet Blanc is a 24 year old F  currently 39w6d  Patient Active Problem List   Diagnosis    Anxiety and depression    ADHD    Maternal varicella, non-immune (HCC)    Obesity in pregnancy (HCC)    Pregnant (HCC)    Group B streptococcal carriage complicating pregnancy (HCC)   3.5/80/-1    -To triage for NST   -Reviewed labor signs and when to call CNM  -Danger sxs reviewed and when to call midwife    RTC 1 week    JEAN Rashid under direct supervision of Phyllis Teixeira CNM

## 2025-03-06 NOTE — NST
Nonstress Test   Patient: Monet Phelps [facility]    Gestation: 39w6d    NST:       Variability: Moderate           Accelerations: Yes           Decelerations: None            Baseline: 135 BPM           Uterine Irritability: No           Contractions: Irregular                    Contraction Frequency: x 2                   Acoustic Stimulator: No           Nonstress Test Interpretation: Reactive           Nonstress Test Second Interpretation: Reactive          FHR Category: Category I          Additional Comments Comments: nst for high bmi, nst reactive, kulwant harrell notified. Patient discharged home, kick counts and labor precautions reviewed

## 2025-03-06 NOTE — PROGRESS NOTES
Patient seen in conjunction with [facility]. I personally witnessed the patient's exam and medical decision making on this date of service.  I was physically present in the room for the performance of the E/M service.  I have reviewed the CNM student's documentation and findings including history, Exam, and Medical Decision Making, edited the document for accuracy and verify that it represents the clinical findings and services performed.

## 2025-03-06 NOTE — PROGRESS NOTES
Pt is a 24 year old female admitted to TR5/TR5-A.     Chief Complaint   Patient presents with    Non-stress Test     Pt. Sent from office for NST for high BMI      Pt is  39w6d intra-uterine pregnancy.  History obtained, pt. Oriented to room, staff, and plan of care.

## 2025-03-10 ENCOUNTER — HOSPITAL ENCOUNTER (INPATIENT)
Facility: HOSPITAL | Age: 25
LOS: 2 days | Discharge: HOME | End: 2025-03-12
Attending: ADVANCED PRACTICE MIDWIFE | Admitting: OBSTETRICS & GYNECOLOGY
Payer: MEDICAID

## 2025-03-10 ENCOUNTER — ANESTHESIA (OUTPATIENT)
Dept: OBGYN UNIT | Facility: HOSPITAL | Age: 25
End: 2025-03-10
Payer: MEDICAID

## 2025-03-10 ENCOUNTER — ANESTHESIA EVENT (OUTPATIENT)
Dept: OBGYN UNIT | Facility: HOSPITAL | Age: 25
End: 2025-03-10
Payer: MEDICAID

## 2025-03-10 PROBLEM — Z34.90 PREGNANCY (HCC): Status: ACTIVE | Noted: 2025-03-10

## 2025-03-10 PROBLEM — Z37.9 NORMAL LABOR (HCC): Status: ACTIVE | Noted: 2025-03-10

## 2025-03-10 LAB
ANTIBODY SCREEN: NEGATIVE
BASOPHILS # BLD AUTO: 0.04 X10(3) UL (ref 0–0.2)
BASOPHILS NFR BLD AUTO: 0.3 %
DEPRECATED RDW RBC AUTO: 44.5 FL (ref 35.1–46.3)
EOSINOPHIL # BLD AUTO: 0.05 X10(3) UL (ref 0–0.7)
EOSINOPHIL NFR BLD AUTO: 0.4 %
ERYTHROCYTE [DISTWIDTH] IN BLOOD BY AUTOMATED COUNT: 14 % (ref 11–15)
HCT VFR BLD AUTO: 35.3 %
HGB BLD-MCNC: 11.5 G/DL
IMM GRANULOCYTES # BLD AUTO: 0.12 X10(3) UL (ref 0–1)
IMM GRANULOCYTES NFR BLD: 0.9 %
LYMPHOCYTES # BLD AUTO: 2.25 X10(3) UL (ref 1–4)
LYMPHOCYTES NFR BLD AUTO: 16.3 %
MCH RBC QN AUTO: 28.6 PG (ref 26–34)
MCHC RBC AUTO-ENTMCNC: 32.6 G/DL (ref 31–37)
MCV RBC AUTO: 87.8 FL
MONOCYTES # BLD AUTO: 0.75 X10(3) UL (ref 0.1–1)
MONOCYTES NFR BLD AUTO: 5.4 %
NEUTROPHILS # BLD AUTO: 10.63 X10 (3) UL (ref 1.5–7.7)
NEUTROPHILS # BLD AUTO: 10.63 X10(3) UL (ref 1.5–7.7)
NEUTROPHILS NFR BLD AUTO: 76.7 %
PLATELET # BLD AUTO: 401 10(3)UL (ref 150–450)
RBC # BLD AUTO: 4.02 X10(6)UL
RH BLOOD TYPE: POSITIVE
T PALLIDUM AB SER QL IA: NONREACTIVE
WBC # BLD AUTO: 13.8 X10(3) UL (ref 4–11)

## 2025-03-10 PROCEDURE — 0HQ9XZZ REPAIR PERINEUM SKIN, EXTERNAL APPROACH: ICD-10-PCS | Performed by: ADVANCED PRACTICE MIDWIFE

## 2025-03-10 PROCEDURE — 59400 OBSTETRICAL CARE: CPT | Performed by: ADVANCED PRACTICE MIDWIFE

## 2025-03-10 RX ORDER — CHOLECALCIFEROL (VITAMIN D3) 25 MCG
1 TABLET,CHEWABLE ORAL DAILY
Status: CANCELLED | OUTPATIENT
Start: 2025-03-10

## 2025-03-10 RX ORDER — CITRIC ACID/SODIUM CITRATE 334-500MG
30 SOLUTION, ORAL ORAL AS NEEDED
Status: DISCONTINUED | OUTPATIENT
Start: 2025-03-10 | End: 2025-03-10 | Stop reason: HOSPADM

## 2025-03-10 RX ORDER — ACETAMINOPHEN 500 MG
500 TABLET ORAL EVERY 6 HOURS PRN
Status: DISCONTINUED | OUTPATIENT
Start: 2025-03-10 | End: 2025-03-10 | Stop reason: HOSPADM

## 2025-03-10 RX ORDER — NALBUPHINE HYDROCHLORIDE 10 MG/ML
2.5 INJECTION INTRAMUSCULAR; INTRAVENOUS; SUBCUTANEOUS
Status: DISCONTINUED | OUTPATIENT
Start: 2025-03-10 | End: 2025-03-12

## 2025-03-10 RX ORDER — SIMETHICONE 80 MG
80 TABLET,CHEWABLE ORAL 3 TIMES DAILY PRN
Status: DISCONTINUED | OUTPATIENT
Start: 2025-03-10 | End: 2025-03-12

## 2025-03-10 RX ORDER — LIDOCAINE HYDROCHLORIDE 10 MG/ML
INJECTION, SOLUTION INFILTRATION; PERINEURAL
Status: COMPLETED | OUTPATIENT
Start: 2025-03-10 | End: 2025-03-10

## 2025-03-10 RX ORDER — SODIUM CHLORIDE, SODIUM LACTATE, POTASSIUM CHLORIDE, CALCIUM CHLORIDE 600; 310; 30; 20 MG/100ML; MG/100ML; MG/100ML; MG/100ML
INJECTION, SOLUTION INTRAVENOUS CONTINUOUS
Status: DISCONTINUED | OUTPATIENT
Start: 2025-03-10 | End: 2025-03-10 | Stop reason: HOSPADM

## 2025-03-10 RX ORDER — CALCIUM CARBONATE 500 MG/1
1000 TABLET, CHEWABLE ORAL EVERY 4 HOURS PRN
Status: DISCONTINUED | OUTPATIENT
Start: 2025-03-10 | End: 2025-03-10 | Stop reason: HOSPADM

## 2025-03-10 RX ORDER — BUPIVACAINE HYDROCHLORIDE 2.5 MG/ML
INJECTION, SOLUTION EPIDURAL; INFILTRATION; INTRACAUDAL
Status: COMPLETED | OUTPATIENT
Start: 2025-03-10 | End: 2025-03-10

## 2025-03-10 RX ORDER — BUPIVACAINE HYDROCHLORIDE 2.5 MG/ML
20 INJECTION, SOLUTION EPIDURAL; INFILTRATION; INTRACAUDAL; PERINEURAL ONCE
Status: DISCONTINUED | OUTPATIENT
Start: 2025-03-10 | End: 2025-03-10 | Stop reason: HOSPADM

## 2025-03-10 RX ORDER — LIDOCAINE HYDROCHLORIDE 10 MG/ML
30 INJECTION, SOLUTION EPIDURAL; INFILTRATION; INTRACAUDAL; PERINEURAL ONCE
Status: COMPLETED | OUTPATIENT
Start: 2025-03-10 | End: 2025-03-10

## 2025-03-10 RX ORDER — LIDOCAINE HYDROCHLORIDE AND EPINEPHRINE 15; 5 MG/ML; UG/ML
INJECTION, SOLUTION EPIDURAL
Status: COMPLETED | OUTPATIENT
Start: 2025-03-10 | End: 2025-03-10

## 2025-03-10 RX ORDER — ACETAMINOPHEN 500 MG
500 TABLET ORAL EVERY 6 HOURS PRN
Status: DISCONTINUED | OUTPATIENT
Start: 2025-03-10 | End: 2025-03-12

## 2025-03-10 RX ORDER — ONDANSETRON 2 MG/ML
4 INJECTION INTRAMUSCULAR; INTRAVENOUS EVERY 6 HOURS PRN
Status: DISCONTINUED | OUTPATIENT
Start: 2025-03-10 | End: 2025-03-10 | Stop reason: HOSPADM

## 2025-03-10 RX ORDER — DEXTROSE, SODIUM CHLORIDE, SODIUM LACTATE, POTASSIUM CHLORIDE, AND CALCIUM CHLORIDE 5; .6; .31; .03; .02 G/100ML; G/100ML; G/100ML; G/100ML; G/100ML
INJECTION, SOLUTION INTRAVENOUS AS NEEDED
Status: DISCONTINUED | OUTPATIENT
Start: 2025-03-10 | End: 2025-03-10 | Stop reason: HOSPADM

## 2025-03-10 RX ORDER — AMMONIA 15 % (W/V)
0.3 AMPUL (EA) INHALATION AS NEEDED
Status: DISCONTINUED | OUTPATIENT
Start: 2025-03-10 | End: 2025-03-12

## 2025-03-10 RX ORDER — BISACODYL 10 MG
10 SUPPOSITORY, RECTAL RECTAL ONCE AS NEEDED
Status: DISCONTINUED | OUTPATIENT
Start: 2025-03-10 | End: 2025-03-12

## 2025-03-10 RX ORDER — DOCUSATE SODIUM 100 MG/1
100 CAPSULE, LIQUID FILLED ORAL
Status: DISCONTINUED | OUTPATIENT
Start: 2025-03-10 | End: 2025-03-12

## 2025-03-10 RX ORDER — IBUPROFEN 600 MG/1
600 TABLET, FILM COATED ORAL EVERY 6 HOURS
Status: DISCONTINUED | OUTPATIENT
Start: 2025-03-10 | End: 2025-03-12

## 2025-03-10 RX ORDER — ACETAMINOPHEN 500 MG
1000 TABLET ORAL EVERY 6 HOURS PRN
Status: DISCONTINUED | OUTPATIENT
Start: 2025-03-10 | End: 2025-03-12

## 2025-03-10 RX ORDER — ACETAMINOPHEN 500 MG
1000 TABLET ORAL EVERY 6 HOURS PRN
Status: DISCONTINUED | OUTPATIENT
Start: 2025-03-10 | End: 2025-03-10 | Stop reason: HOSPADM

## 2025-03-10 RX ORDER — IBUPROFEN 600 MG/1
600 TABLET, FILM COATED ORAL ONCE AS NEEDED
Status: DISCONTINUED | OUTPATIENT
Start: 2025-03-10 | End: 2025-03-10 | Stop reason: HOSPADM

## 2025-03-10 RX ORDER — TERBUTALINE SULFATE 1 MG/ML
0.25 INJECTION SUBCUTANEOUS AS NEEDED
Status: DISCONTINUED | OUTPATIENT
Start: 2025-03-10 | End: 2025-03-10 | Stop reason: HOSPADM

## 2025-03-10 RX ORDER — BUPIVACAINE HCL/0.9 % NACL/PF 0.25 %
5 PLASTIC BAG, INJECTION (ML) EPIDURAL AS NEEDED
Status: DISCONTINUED | OUTPATIENT
Start: 2025-03-10 | End: 2025-03-12

## 2025-03-10 RX ADMIN — LIDOCAINE HYDROCHLORIDE AND EPINEPHRINE 5 ML: 15; 5 INJECTION, SOLUTION EPIDURAL at 04:15:00

## 2025-03-10 RX ADMIN — BUPIVACAINE HYDROCHLORIDE 5 ML: 2.5 INJECTION, SOLUTION EPIDURAL; INFILTRATION; INTRACAUDAL at 04:15:00

## 2025-03-10 RX ADMIN — LIDOCAINE HYDROCHLORIDE 5 ML: 10 INJECTION, SOLUTION INFILTRATION; PERINEURAL at 04:15:00

## 2025-03-10 NOTE — H&P
[facility]  part of [facility]    History & Physical    Monet Blanc Patient Status:  Inpatient    2000 MRN [id number]   Location [facility] Attending Jazmine Plascencia CNM   Hosp Day # 0 Admitting Deloris Aaron DO     Date of Admission:  3/10/2025      HPI:   Monet Blanc is 24 year old and  with current gestational age of 40w3d and estimated due date of 3/7/2025, by Ultrasound consistent with 11 week ultrasound    Monet is being admitted for labor management.    Her current obstetrical history is significant for GBS colonizer, varicella non immune, obesity.    Patient reports good fetal movement, no bleeding, no leaking, and contractions  .     Fetal Movement: normal.     History   Obstetric History:   OB History    Para Term  AB Living   1 0 0 0 0 0   SAB IAB Ectopic Multiple Live Births   0 0 0 0 0      # Outcome Date GA Lbr Markus/2nd Weight Sex Type Anes PTL Lv   1 Current              Past Medical History:   Past Medical History:    Acute flank pain    Acute tonsillitis, unspecified    Amenorrhea    Constipation    Decorative tattoo    Mental disorder    anxiety     Past Social History: No past surgical history on file.  Family History:   Family History   Problem Relation Age of Onset    No Known Problems Father     No Known Problems Mother     Diabetes Maternal Grandmother     Heart Disease Maternal Grandmother     Heart Disorder Other         Sidden cardiac death-no close relative     Social History:   Social History     Tobacco Use    Smoking status: Never    Smokeless tobacco: Never   Substance Use Topics    Alcohol use: No        Allergies/Medications:   Allergies:   Allergies[1]  Medications:  Prescriptions Prior to Admission[2]    Review of Systems:   Constitutional: denies fever, aches, chills  HEENT: denies visual changes  Skin: denies any unusual skin lesions or itching  Lungs: denies shortness  of breath  Cardiovascular: denies chest pain  GI: denies abdominal pain,denies heartburn, denies constipation or diarrhea  : denies dysuria, pelvic pain, vaginal discharge or bleeding  Musculoskeletal: denies back or joint pain  Neuro denies headaches or dizziness  Psych: denies depression or anxiety    Physical Exam:        Constitutional: alert, appears stated age, cooperative, and uncomfortable with contractions  Skin: no lesions or erythema  Respiratory: clear, unlabored  Cardiac: regular rate and rhythm   Abdomen: soft, non-tender, EFW 8 1/2#,  uterine contractions q2-4 minutes  Fetal Surveillance:  130 BPM; Fetal heart variability: moderate  Fetal Heart Rate decelerations: none  Fetal Heart Rate accelerations: yes  Pelvis: Gynecoid  External Genitalia:  No lesions  Sterile vaginal exam: deferred  5/100/-1 cephalic per RN  Extremities: extremities normal, atraumatic, no cyanosis or edema  Musculoskeletal: steady gait  Psych:  Appropriate affect and speech    Results:     Prenatal Results       Initial       Test Value Reference Range Date Time    Blood Type (ABO Group)  AB   08/19/24 1840    Rh Factor  Positive   08/19/24 1840    Antibody Screen (Required questions in OE to answer)  Negative   08/19/24 1840    HCT  37.5 % 35.0 - 48.0 08/19/24 1840       38.0 % 35.0 - 45.0 06/28/24 1145    HGB  13.1 g/dL 12.0 - 16.0 08/19/24 1840       12.2 g/dL 11.7 - 15.5 06/28/24 1145    MCV  86.8 fL 80.0 - 100.0 08/19/24 1840       90.9 fL 80.0 - 100.0 06/28/24 1145    Platelets  388.0 10(3)uL 150.0 - 450.0 08/19/24 1840       379 Thousand/uL 140 - 400 06/28/24 1145    Rubella  Positive  Positive 08/19/24 1840    TREP  Nonreactive  Nonreactive  08/19/24 1840    RPR ([facility])        Urine Culture  10-50,000 cfu/ml Multiple species present-probable contamination.   08/19/24 1840    Hepatitis B  Nonreactive  Nonreactive  08/19/24 1840    HIV Combo  Non-Reactive  Non-Reactive 08/19/24 1840    HCV  Nonreactive  Nonreactive   24 1840              Optional Initial Labs       Test Value Reference Range Date Time    TSH  1.17 mIU/L  24 1145    Pap Smear  Negative for intraepithelial lesion or malignancy  Negative for intraepithelial lesion or malignancy 24 1920    HPV        GC DNA  Negative  Negative 24 1920    Chlamydia DNA  Negative  Negative 24 1920    GTT 1 Hr        Glucose Fasting        Glucose 1 Hr        Glucose 2 Hr        Glucose 3 Hr        HgB A1c  5.1 % <5.7 24 1840       5.5 % of total Hgb <5.7 24 1145    Vitamin D                  8-20 Weeks       Test Value Reference Range Date Time    1st Trimester Aneuploidy Risk Assessment        Quad - Down Screen Risk Estimate - prior to 18        Quad - Down Maternal Age Risk - prior to 18        Quad - Trisomy 18 screen Risk Estimate - prior to 18        AFP Spina Bifida (Required questions in OE to answer )        QUAD/AFP - Interpretation        Free Fetal DNA         Genetic testing        Genetic testing        Genetic testing        GC DNA        Chlamydia DNA                  24-28 Weeks       Test Value Reference Range Date Time    HCT  35.9 % 35.0 - 48.0 24 1341    HGB  11.4 g/dL 12.0 - 16.0 24 1341    Platelets  384.0 10(3)uL 150.0 - 450.0 24 1341    GTT 1 Hr  156 mg/dL See comment 24 1341    Glucose Fasting  93 mg/dL See comment 24 0714    Glucose 1 Hr  155 mg/dL See comment 24 0823    Glucose 2 Hr  137 mg/dL See comment 24 0922    Glucose 3 Hr  64 mg/dL 70 - <140 24 1027    TSH         Profile        Urine Culture        GC DNA  Negative  Negative 11/15/24 1329    Chlamydia DNA  Negative  Negative 11/15/24 1329              35-37 Weeks       Test Value Reference Range Date Time    HCT        HGB        Platelets        TREP  Nonreactive  Nonreactive  24 1341    RPR ([facility])        Genital Group B Culture  Positive  Negative 25 1524        Streptococcus agalactiae (Group B beta strep)   25 1524    TSH        Urine Culture        HIV Combo  Non-Reactive  Non-Reactive 24 1341    GC DNA  Negative  Negative 25 1524    Chlamydia DNA  Negative  Negative 25 1524    Rapid HIV                  Optional Labs       Test Value Reference Range Date Time    HgB A1c  5.3 % <5.7 24 0714    HGB Electrophoresis  (See Report)    24 1840    Varicella Zoster  53.96  >165.00 24 1840    Cystic Fibrosis-Old         Cystic Fibrosis[32] (Required questions in OE to answer)        Cystic Fibrosis[165] (Required questions in OE to answer)        Cystic Fibrosis[165] (Required questions in OE to answer)        Cystic Fibrosis[165] (Required questions in OE to answer)        Sickle Cell        24Hr Urine Protein        24Hr Urine Creatinine        Parvo B19 IgM        Parvo B19 IgG                  Legend    ^: Historical                            Reviewed all prenatal ultrasounds    Admit labs pending    Assessment/Plan:   Monet is 24 year old and  with current gestational age of 40w3d and estimated due date of 3/7/2025, by Ultrasound consistent with 11 week ultrasound    Active phase labor.  Category 1 FHR tracing  Obstetrical history significant for GBS colonizer, varicella non immune, obesity    Admission problem(s):    Normal labor (HCC)  -Admit, routine labs  -Epidural per pt request. May have IV Fentanyl while awaiting epidural  -Expectant mgmt      Anxiety and depression  -Was on meds but stopped 1 mn ago      ADHD        Maternal varicella, non-immune (HCC)  -Vaccinate PP      Obesity in pregnancy (HCC)  -Normal Level 2. Growth ultrasound 4# 4 oz 43% @ 32 wks      Group B streptococcal carriage complicating pregnancy (HCC)  -Start Ampicillin              Risks, benefits, alternatives and possible complications have been discussed in detail with the patient.   Pre-admission, admission, and post admission procedures and  expectations were discussed in detail.    All questions answered, all appropriate consents will be signed at the Hospital. Admission is planned for today.   Expectant management. and Anticipate vaginal delivery..    Jazmine Plascencia CNM  3/10/2025  3:52 AM        [1]   Allergies  Allergen Reactions    Cat Dander [Cat Hair Extract] HIVES    Dust OTHER (SEE COMMENTS)     sneezing   [2]   Medications Prior to Admission   Medication Sig Dispense Refill Last Dose/Taking    acetaminophen 500 MG Oral Tab Take 2 tablets (1,000 mg total) by mouth every 6 (six) hours as needed for Pain.   3/9/2025    amoxicillin clavulanate 875-125 MG Oral Tab Take 1 tablet by mouth 2 (two) times daily for 10 days. 20 tablet 0 3/9/2025    Prenatal Vit-Fe Fumarate-FA (MULTI PRENATAL) 27-0.8 MG Oral Tab Take by mouth.   3/9/2025    ondansetron 4 MG Oral Tablet Dispersible        sertraline 50 MG Oral Tab Take 1.5 tablets (75 mg total) by mouth daily. 45 tablet 1 More than a month

## 2025-03-10 NOTE — L&D DELIVERY NOTE
[facility]  part of [facility]    Vaginal Delivery Note    Monet Mares Patient Status:  Inpatient    2000 MRN [id number]   Location [facility] Attending Jazmine Plascencia CNM   Hosp Day # 0 PCP Inocente Butt,      Delivery     Diagnosis:     Labor Details: Spontaneous    Procedure: spontaneous vaginal delivery    Neonatologist Present: yes    Placenta  Date/Time of Delivery: 3/10/2025 10:38 AM   Delivery: spontaneous  Placenta to Pathology: no  Cord Gases Submitted: no    Cord Complications: body    Maternal Anesthesia: epidural and local   Episiotomy/Laceration Repair  Laceration: vaginal 1st degree  Location: right  Suture Size/Type: 3-0 Chromic  Anesthesia: 1% lidocaine  Repair Comments: Hemostatic and repaired in the usual fashion    Sponge and Needle Counts:  Verified yes    Delivery Complications  none    Hemorrhage?: No, patient is stable, asymptomatic and blood loss is within expected amount following delivery. Standard treatment provided to prevent PPH. Patient does not meet ACOG criteria for hemorrhage at this time.    QBL: Quantitative Blood Loss (mL) 267    Delivery Comments: Monet progressed to complete dilatation and +2 station prior to pushing successfully to viable baby girl. See Delivery Summary above for time, APGARs, and weight. Fetal head presented in the MALCOM position. Sweep for nuchal cord was performed and no nuchal cord identified. Anterior shoulder not initially delivered with next push. Head of bed put flat and with next contraction pushed with delivery of shoulder with slight traction. Body cord identified and reduced after delivery. Baby not vigorous at birth, cord clamped and cut and baby to warmer with neonatologist. Prophylactic IV pitocin initiated in 3rd stage. Spontaneous placenta by Melanie mechanism, complete with 3 vessel cord. Segment of cord for gases. Hemostasis achieved by fundal massage and  prophylactic IV Pitocin & sweep of clots from cervix. Vagina and perineum inspected and vaginal first degree identified and repaired in usual fashion. Mother and infant appeared in stable condition when midwife left the delivery room. Sharps and 4x4 counts were correct. Breastfeeding initiated.    JEAN Rashid under direct supervision of Jazmine Plascencia CNM    Patient cared for in conjunction with [facility]. I personally witnessed the patient's exam and medical decision making on this date of service.  I was physically present in the room, gowned and gloved and providing care and support during the performance of the E/M service.  I have reviewed the ARYANM student's documentation and findings including history, Exam, and Medical Decision Making, edited the document for accuracy and verify that it represents the clinical findings and services performed.

## 2025-03-10 NOTE — ANESTHESIA POSTPROCEDURE EVALUATION
Patient: Monet Blanc    Procedure Summary       Date: 03/10/25 Room / Location:     Anesthesia Start: 0415 Anesthesia Stop: 1038    Procedure: LABOR ANALGESIA Diagnosis:     Scheduled Providers:  Anesthesiologist: Alex James DO    Anesthesia Type: epidural ASA Status: 3            Anesthesia Type: epidural    Vitals Value Taken Time   /73 03/10/25 1106   Temp 98.6 03/10/25 1106   Pulse 94 03/10/25 1039   Resp 14 03/10/25 1106   SpO2 97 % 03/10/25 1039   Vitals shown include unfiled device data.    EM AN Post Evaluation:   Patient Evaluated in floor  Patient Participation: complete - patient participated  Level of Consciousness: awake and alert  Pain Management: adequate  Airway Patency:patent  Yes    Cardiovascular Status: acceptable  Respiratory Status: acceptable  Postoperative Hydration acceptable      Alex James DO  3/10/2025 11:06 AM

## 2025-03-10 NOTE — PROGRESS NOTES
Pt is a 24 year old female admitted to   TR2     Chief Complaint   Patient presents with    R/o Labor      Pt is  40w3d intra-uterine pregnancy.  History obtained, consents signed. Oriented to room, staff, and plan of care.

## 2025-03-10 NOTE — PROGRESS NOTES
Pt is a 24 year old female admitted to R4/LDR4-A.     Chief Complaint   Patient presents with    R/o Labor      Pt is  40w3d intra-uterine pregnancy.  History obtained, consents signed. Oriented to room, staff, and plan of care.

## 2025-03-10 NOTE — LACTATION NOTE
This note was copied from a baby's chart.     03/10/25 5089   Evaluation Type   Evaluation Type Inpatient   Problems & Assessment   Problems Diagnosed or Identified Latch difficulty; feeding problem;Tongue restriction   Problems: comment/detail Assisted with BF upon request, infant has not sustained latch to date, provided with drops of colostrum   Muscle tone Appropriate for GA   Feeding Assessment   Summary Current Feeding Breastfeeding exclusively   Breastfeeding Assessment Assisted with breastfeeding w/mother's permission;No sustained latch to breast;Calm and ready to breastfeed   Breastfeeding lasted # of minutes 20   Breastfeeding Positions football;laid back;right breast;left breast   Latch 0   Audible Sucks/Swallows 0   Type of Nipple 2   Comfort (Breast/Nipple) 1   Hold (Positioning) 1   LATCH Score 4   Other (comment) Assisted with manual expression and few drops by spoon, manual pump provided with instruction, supplement recommended/provided. Bottle given with slow flow nipple, unorganized, leaking noted,   Pre/Post Weights   Supplement Type Formula   Supplement total, ml 8   Equipment used   Equipment used Bottle with slow flow nipple

## 2025-03-10 NOTE — PROGRESS NOTES
Patient up to bathroom with assist x 2.  Voided 500. Patient transferred to mother/baby room 360 per wheelchair in stable condition with baby and personal belongings.  Accompanied by significant other and staff.  Report given to Marleni mother/baby MALLORY.

## 2025-03-10 NOTE — ANESTHESIA PREPROCEDURE EVALUATION
Anesthesia PreOp Note    HPI:     Monet Blanc is a 24 year old female who presents for preoperative consultation requested by: * No surgeons listed *    Date of Surgery: 3/10/2025    * No procedures listed *  Indication: * No pre-op diagnosis entered *    Relevant Problems   No relevant active problems       NPO:                         History Review:  Patient Active Problem List    Diagnosis Date Noted    Pregnancy ([facility]) 03/10/2025    Normal labor ([facility]) 03/10/2025    Group B streptococcal carriage complicating pregnancy ([facility]) 02/26/2025    Pregnant ([facility]) 02/20/2025    Obesity in pregnancy ([facility]) 09/18/2024    Maternal varicella, non-immune ([facility]) 08/23/2024    Anxiety and depression 08/19/2024    ADHD 08/19/2024       Past Medical History:    Acute flank pain    Acute tonsillitis, unspecified    Amenorrhea    Constipation    Decorative tattoo    Mental disorder    anxiety       History reviewed. No pertinent surgical history.    Prescriptions Prior to Admission[1]  Current Medications and Prescriptions Ordered in Epic[2]    Allergies[3]    Family History   Problem Relation Age of Onset    No Known Problems Father     No Known Problems Mother     Diabetes Maternal Grandmother     Heart Disease Maternal Grandmother     Heart Disorder Other         Sidden cardiac death-no close relative     Social History     Socioeconomic History    Marital status: Single   Tobacco Use    Smoking status: Never    Smokeless tobacco: Never   Substance and Sexual Activity    Alcohol use: No    Drug use: No   Other Topics Concern    Caffeine Concern Yes     Comment: Sod       Available pre-op labs reviewed.  Lab Results   Component Value Date    WBC 13.8 (H) 03/10/2025    RBC 4.02 03/10/2025    HGB 11.5 (L) 03/10/2025    HCT 35.3 03/10/2025    MCV 87.8 03/10/2025    MCH 28.6 03/10/2025    MCHC 32.6 03/10/2025    RDW 14.0 03/10/2025    .0 03/10/2025             Vital Signs:  Body mass index is 38.94 kg/m².   weight is  106.1 kg (234 lb). Her blood pressure is 111/50 and her pulse is 68. Her oxygen saturation is 99%.   Vitals:    03/10/25 0420 03/10/25 0439 03/10/25 0445   BP: 147/71 108/57 111/50   Pulse: 93 72 68   SpO2: 99% 99% 99%   Weight:  106.1 kg (234 lb)         Anesthesia Evaluation     Patient summary reviewed and Nursing notes reviewed    Airway   Mallampati: II  TM distance: >3 FB  Neck ROM: full  Dental - Dentition appears grossly intact     Pulmonary - negative ROS and normal exam   Cardiovascular - negative ROS and normal exam    Neuro/Psych    (+)  anxiety/panic attacks,        GI/Hepatic/Renal - negative ROS     Endo/Other      Comments: Morbid obesity  Abdominal   (+) obese                 Anesthesia Plan:   ASA:  3  Plan:   Epidural  Post-op Pain Management: IV analgesics  Informed Consent Plan and Risks Discussed With:  Patient  Use of Blood Products Discussed With:  Patient  Blood Product Use Consented    Discussed plan with:  Surgeon      I have informed Monet Phelps Guanaco and/or legal guardian or family member of the nature of the anesthetic plan, benefits, risks including possible dental damage if relevant, major complications, and any alternative forms of anesthetic management.   All of the patient's questions were answered to the best of my ability. The patient desires the anesthetic management as planned.  Tani Duong MD  3/10/2025 5:00 AM  Present on Admission:   ADHD   Anxiety and depression   Group B streptococcal carriage complicating pregnancy (HCC)   Maternal varicella, non-immune (HCC)   Obesity in pregnancy (HCC)           [1]   Medications Prior to Admission   Medication Sig Dispense Refill Last Dose/Taking    acetaminophen 500 MG Oral Tab Take 2 tablets (1,000 mg total) by mouth every 6 (six) hours as needed for Pain.   3/9/2025    amoxicillin clavulanate 875-125 MG Oral Tab Take 1 tablet by mouth 2 (two) times daily for 10 days. 20 tablet 0 3/9/2025    Prenatal Vit-Fe  Fumarate-FA (MULTI PRENATAL) 27-0.8 MG Oral Tab Take by mouth.   3/9/2025    ondansetron 4 MG Oral Tablet Dispersible        sertraline 50 MG Oral Tab Take 1.5 tablets (75 mg total) by mouth daily. 45 tablet 1 More than a month   [2]   Current Facility-Administered Medications Ordered in Epic   Medication Dose Route Frequency Provider Last Rate Last Admin    acetaminophen (Tylenol Extra Strength) tab 500 mg  500 mg Oral Q6H PRN Jazmine Plascencia CNM        acetaminophen (Tylenol Extra Strength) tab 1,000 mg  1,000 mg Oral Q6H PRN Jazmine Plascencia CNM        ibuprofen (Motrin) tab 600 mg  600 mg Oral Once PRN Jazmine Plascencia CNM        ondansetron (Zofran) 4 MG/2ML injection 4 mg  4 mg Intravenous Q6H PRN Jazmine Plascencia CNM        oxyTOCIN in sodium chloride 0.9% (Pitocin) 30 Units/500mL infusion premix  62.5-900 ivana-units/min Intravenous Continuous Jazmine Plascencia CNM        terbutaline (Brethine) 1 MG/ML injection 0.25 mg  0.25 mg Subcutaneous PRN Jazmine Plascencia CNM        sodium citrate-citric acid (Bicitra) 500-334 MG/5ML oral solution 30 mL  30 mL Oral PRN Jazmine Plascencia CNM        lidocaine PF (Xylocaine-MPF) 1% injection  30 mL Intradermal Once Jazmine Plascencia CNM        lactated ringers infusion   Intravenous Continuous Jazmine Plascencia CNM        dextrose in lactated ringers 5% infusion   Intravenous PRN Jazmine Plascencia CNM        lactated ringers IV bolus 500 mL  500 mL Intravenous PRN Jazmine Plascencia CNM        fentaNYL (Sublimaze) 50 mcg/mL injection 50 mcg  50 mcg Intravenous Q30 Min PRN Jazmine Plascencia CNM        calcium carbonate (Tums) chewable tab 1,000 mg  1,000 mg Oral Q4H PRN Jazmine Plascencia CNM        ampicillin (Omnipen) 1 g in sodium chloride 0.9% 100 mL IVPB-MBP  1 g Intravenous Q4H Jazmine Plascencia CNM        fentaNYL-bupivacaine 2 mcg/mL-0.125% in sodium chloride 0.9% 100 mL EPIDURAL infusion premix   Epidural Continuous  Tani Duong MD        fentaNYL (Sublimaze) 50 mcg/mL injection 100 mcg  100 mcg Epidural Once Tani Duong MD        bupivacaine PF (Marcaine) 0.25% injection  20 mL Epidural Once Tani Duong MD        ePHEDrine (PF) 25 MG/5 ML injection 5 mg  5 mg Intravenous PRN Tani Duong MD        nalbuphine (Nubain) 10 mg/mL injection 2.5 mg  2.5 mg Intravenous Q15 Min PRN Tani Duong MD         No current Epic-ordered outpatient medications on file.   [3]   Allergies  Allergen Reactions    Cat Dander [Cat Hair Extract] HIVES    Dust OTHER (SEE COMMENTS)     sneezing

## 2025-03-10 NOTE — ANESTHESIA PROCEDURE NOTES
Labor Analgesia    Date/Time: 3/10/2025 4:15 AM    Performed by: Tani Duong MD  Authorized by: Tani Duong MD      General Information and Staff    Start Time:  3/10/2025 4:15 AM  End Time:  3/10/2025 4:25 AM  Anesthesiologist:  Tani Duong MD  Performed by:  Anesthesiologist  Site Identification: surface landmarks    Reason for Block: labor epidural    Preanesthetic Checklist: patient identified, IV checked, risks and benefits discussed, monitors and equipment checked, pre-op evaluation, timeout performed, anesthesia consent and sterile technique used      Procedure Details    Patient Position:  Sitting  Prep: Betadine and patient draped    Monitoring:  Heart rate, cardiac monitor and continuous pulse ox  Approach:  Midline    Epidural Needle    Injection Technique:  BRAYDEN air  Needle Type:  Tuohy  Needle Gauge:  18 G  Needle Length:  3.375 in  Needle Insertion Depth:  6  Location:  L3-4    Spinal Needle      Catheter    Catheter Type:  Side hole  Catheter Size:  20 G  Catheter at Skin Depth:  14    Assessment    Sensory Level:  T8    Additional Comments

## 2025-03-11 LAB
BASOPHILS # BLD AUTO: 0.02 X10(3) UL (ref 0–0.2)
BASOPHILS NFR BLD AUTO: 0.2 %
DEPRECATED RDW RBC AUTO: 45.3 FL (ref 35.1–46.3)
EOSINOPHIL # BLD AUTO: 0.1 X10(3) UL (ref 0–0.7)
EOSINOPHIL NFR BLD AUTO: 0.8 %
ERYTHROCYTE [DISTWIDTH] IN BLOOD BY AUTOMATED COUNT: 14.3 % (ref 11–15)
HCT VFR BLD AUTO: 28.2 %
HGB BLD-MCNC: 9.5 G/DL
IMM GRANULOCYTES # BLD AUTO: 0.06 X10(3) UL (ref 0–1)
IMM GRANULOCYTES NFR BLD: 0.5 %
LYMPHOCYTES # BLD AUTO: 2.54 X10(3) UL (ref 1–4)
LYMPHOCYTES NFR BLD AUTO: 20.6 %
MCH RBC QN AUTO: 29.5 PG (ref 26–34)
MCHC RBC AUTO-ENTMCNC: 33.7 G/DL (ref 31–37)
MCV RBC AUTO: 87.6 FL
MONOCYTES # BLD AUTO: 0.7 X10(3) UL (ref 0.1–1)
MONOCYTES NFR BLD AUTO: 5.7 %
NEUTROPHILS # BLD AUTO: 8.89 X10 (3) UL (ref 1.5–7.7)
NEUTROPHILS # BLD AUTO: 8.89 X10(3) UL (ref 1.5–7.7)
NEUTROPHILS NFR BLD AUTO: 72.2 %
PLATELET # BLD AUTO: 263 10(3)UL (ref 150–450)
RBC # BLD AUTO: 3.22 X10(6)UL
WBC # BLD AUTO: 12.3 X10(3) UL (ref 4–11)

## 2025-03-11 NOTE — PLAN OF CARE
Problem: POSTPARTUM  Goal: Optimize infant feeding at the breast  Description: INTERVENTIONS:  - Initiate breast feeding within first hour after birth.   - Monitor effectiveness of current breast feeding efforts.  - Assess support systems available to mother/family.  - Identify cultural beliefs/practices regarding lactation, letdown techniques, maternal food preferences.  - Assess mother's knowledge and previous experience with breast feeding.  - Provide information as needed about early infant feeding cues (e.g., rooting, lip smacking, sucking fingers/hand) versus late cue of crying.  - Discuss/demonstrate breast feeding aids (e.g., infant sling, nursing footstool/pillows, and breast pumps).  - Encourage mother/other family members to express feelings/concerns, and actively listen.  - Educate father/SO about benefits of breast feeding and how to manage common lactation challenges.  - Recommend avoidance of specific medications or substances incompatible with breast feeding.  - Assess and monitor for signs of nipple pain/trauma.  - Instruct and provide assistance with proper latch.  - Review techniques for milk expression (breast pumping) and storage of breast milk. Provide pumping equipment/supplies, instructions and assistance, as needed.  - Encourage rooming-in and breast feeding on demand.  - Encourage skin-to-skin contact.  - Provide LC support as needed.  - Assess for and manage engorgement.  - Provide breast feeding education handouts and information on community breast feeding support.   3/10/2025 2351 by Lina Brock, RN  Outcome: Progressing  3/10/2025 1945 by Lina Brock, RN  Outcome: Progressing  Goal: Establishment of adequate milk supply with medication/procedure interruptions  Description: INTERVENTIONS:  - Review techniques for milk expression (breast pumping).   - Provide pumping equipment/supplies, instructions, and assistance until it is safe to breastfeed infant.  3/10/2025 2351 by  Lina Brock, RN  Outcome: Progressing  3/10/2025 1945 by Lina Brock, RN  Outcome: Progressing  Goal: Appropriate maternal -  bonding  Description: INTERVENTIONS:  - Assess caregiver- interactions.  - Assess caregiver's emotional status and coping mechanisms.  - Encourage caregiver to participate in  daily care.  - Assess support systems available to mother/family.  - Provide /case management support as needed.  3/10/2025 2351 by Lina Brock, RN  Outcome: Progressing  3/10/2025 1945 by Lina Brock, RN  Outcome: Progressing

## 2025-03-11 NOTE — LACTATION NOTE
03/10/25 4360   Evaluation Type   Evaluation Type Inpatient   Problems identified   Problems identified Knowledge deficit   Milk supply not WNL Reduced (potential)   Breastfeeding goal   Breastfeeding goal To maintain breast milk feeding per patient goal   Maternal Assessment   Bilateral Breasts Soft;Symmetrical;Elastic;Pendulous   Bilateral Nipples Thick/dense;Colostrum easily expressed;Everted   Prior breastfeeding experience (comment below) Primip   Breastfeeding Assistance Breastfeeding assistance provided with permission;Pumping assistance provided with permission;Hand expression provided with permission;Breast exam provided with permission   Pain assessment   Pain, additional Pain location   Pain Location Nipples   Location/Comment with few tugs, shallow latch   Treatment of Sore Nipples Deeper latch techniques   Guidelines for use of:   Breast pump type Hand Pump   Current use of pump: introduced at this time   Suggested use of pump Pump each time a supplement is offered;Pump if infant is not latching to breast   Reported pumping volumes (ml) drop

## 2025-03-11 NOTE — PROGRESS NOTES
[facility]  part of [facility]    OB/GYNE Progress Note      Monet Blanc Patient Status:  Inpatient    2000 MRN [id number]   Location [facility] 3SE Attending Jazmine Plascencia, ANIKET   Hosp Day # 1 PCP Inocente Butt, DO        Assessment/Plan   Monet Blanc is a 24 year old , day 1 after a vaginal delivery  Breast and formula feeding. Having some difficulty with latch. Lactation to see patient today  Discharge home anticipated tomorrow  Postpartum teaching completed including danger signs and when to call the CNM      Anxiety and depression  Stable on sertraline      Maternal varicella, non-immune (HCC)  Offer vaccine prior to discharge      Obesity in pregnancy ([facility])  Delivered      Group B streptococcal carriage complicating pregnancy ([facility])  Adequately treated      Pregnancy ([facility])  Delivered      Normal labor ([facility])   Delivered       (normal spontaneous vaginal delivery) ([facility])  Stable PPD #1      Postpartum anemia ([facility])  Asymptomatic  Initiate IV iron        Discussed with: nurse     patient and partner     Plan discussed with patient who verbalizes understanding and agreement.        Subjective   Currently she denies excessive bleeding or pain. No clots. Denies SOB, chest pain, HA, dizziness. + void. + stool  Support at home: mom and partner  Has car seat   Undecided on birth control    Review of Systems:  Constitutional: Denies   Genitourinary: Denies   Respiratory: Denies   Psychiatric: Denies         Objective   Vital signs in last 24 hours:  Temp:  [98.4 °F (36.9 °C)-99.9 °F (37.7 °C)] 98.4 °F (36.9 °C)  Pulse:  [] 63  Resp:  [14-20] 18  BP: (108-133)/(48-79) 118/48  SpO2:  [76 %-100 %] 97 %    Input/Output:    Intake/Output Summary (Last 24 hours) at 3/11/2025 0827  Last data filed at 3/10/2025 1500  Gross per 24 hour   Intake --   Output 1867 ml   Net -1867 ml       Weight (Last 6):  Wt Readings from Last 6 Encounters:    03/10/25 234 lb (106.1 kg)   03/06/25 234 lb (106.1 kg)   02/26/25 231 lb 6.4 oz (105 kg)   02/20/25 228 lb 6.4 oz (103.6 kg)   01/28/25 225 lb (102.1 kg)   01/15/25 219 lb 8 oz (99.6 kg)     Body mass index is 38.94 kg/m².     Exam:  Constitutional: Comfortable and bonding well with infant   Uterus: Fundus firm, below umbilicus   Wound/Incision: Well-approximated, no swelling or edema, small lochia rubra, no clots   Respiratory: Unlabored respirations   Extremities: No edema. No evidence of DVT on exam   Psychiatric: Calm affect, appropriate     DVT Risk Score: Low risk        Results:     Lab Results   Component Value Date    TREPONEMALAB Nonreactive 03/10/2025    ABO AB 03/10/2025    RH Positive 03/10/2025    WBC 12.3 (H) 03/11/2025    HGB 9.5 (L) 03/11/2025    HCT 28.2 (L) 03/11/2025    .0 03/11/2025    GLU 94 06/28/2024    T4F 0.73 02/15/2014    TSH 0.812 09/16/2020       Lab Results   Component Value Date    COLORUR Yellow 01/16/2020    CLARITY Hazy (A) 01/16/2020    SPECGRAVITY 1.006 01/16/2020    PROUR Negative 01/16/2020    GLUUR Negative 01/16/2020    KETUR Negative 01/16/2020    BILUR Negative 01/16/2020    BLOODURINE Small (A) 01/16/2020    NITRITE Negative 01/16/2020    UROBILINOGEN <2.0 01/16/2020    LEUUR Negative 01/16/2020       No results found.          Phyllis Teixeira CNM  3/11/2025  8:27 AM

## 2025-03-12 VITALS
OXYGEN SATURATION: 97 % | BODY MASS INDEX: 39 KG/M2 | SYSTOLIC BLOOD PRESSURE: 125 MMHG | RESPIRATION RATE: 16 BRPM | HEART RATE: 77 BPM | WEIGHT: 234 LBS | DIASTOLIC BLOOD PRESSURE: 74 MMHG | TEMPERATURE: 99 F

## 2025-03-12 RX ORDER — IBUPROFEN 600 MG/1
600 TABLET, FILM COATED ORAL EVERY 6 HOURS PRN
Qty: 30 TABLET | Refills: 1 | Status: SHIPPED | OUTPATIENT
Start: 2025-03-12

## 2025-03-12 RX ORDER — FERROUS SULFATE 325(65) MG
325 TABLET, DELAYED RELEASE (ENTERIC COATED) ORAL EVERY OTHER DAY
Qty: 30 TABLET | Refills: 2 | Status: SHIPPED | OUTPATIENT
Start: 2025-03-12

## 2025-03-12 NOTE — CM/SW NOTE
Sw received order for EPDS.    SW met with patient and FOB bedside.  SW confirmed face sheet contact as correct.    Baby girl name: Mary  Date & time of delivery: 3/10/2025 10:38 AM   Delivery method: Vaginal  Siblings age: NA    Patient employed: No  Length of maternity leave: NA    Father of baby employed: Yes  Length of paternity leave: 2 weeks    Breast or formula feed: Both    Pediatrician: Inocente Butt DO    Infant Insurance: Medicaid   Change HC contacted: Yes    Mental Health History: Anxiety, Depression    Medications: Zoloft (per Pt, resumed after delivery)    Therapist: Candy Russo    Psychiatrist: Michelle Ruelas MD    SW discussed signs, symptoms and risks associated with post partum depression & anxiety.  SW provided pt with PMAD resources.  Other resources provided: Anxiety and Depression information, Mom line, Mom Mood Boost, [facility]    Patient support system: FOB, Family and Friends    Patient denied current questions/needs from SW.    SW to remain available for support and/or discharge planning.    Cecilia Alcaraz MSW, LSW   [id number]

## 2025-03-12 NOTE — PLAN OF CARE

## 2025-03-12 NOTE — LACTATION NOTE
LACTATION NOTE - MOTHER      Evaluation Type: Inpatient    Problems identified  Problems identified: Knowledge deficit, Milk supply not WNL  Milk supply not WNL: Reduced (potential)  Problems Identified Other: supplementing with formula has not breast fed or pumped in over 24 hours.    Maternal history  Maternal history: Anxiety    Breastfeeding goal  Breastfeeding goal: To maintain breast milk feeding per patient goal    Maternal Assessment  Bilateral Breasts: Soft, Symmetrical, Elastic, Pendulous  Bilateral Nipples: Thick/dense, Colostrum easily expressed, Everted  Prior breastfeeding experience (comment below): Primip  Breastfeeding Assistance: Breastfeeding assistance provided with permission, Hand expression provided with permission, Pumping assistance provided with permission    Pain assessment  Pain Location: Nipples  Location/Comment: sore  Treatment of Sore Nipples: Deeper latch techniques, Expressed breast milk, Lanolin    Guidelines for use of:  Equipment: Nipple shield  Breast pump type: Ameda Platinum, Hand Pump, Spectra  Suggested use of pump: Pump if infant is not latching to breast, Pump each time a supplement is offered, Pump after nursing if a nipple shield is used, Pump 8-12X/24hr                Called to room by RN to assist with a feeding. Patient has not tried to latch or pump in over 36 hours. Assisted with a latching session, able to get some drops with hand expression, and then able to have a few sucks with the nipple shield in the football hold. Baby then more sleepy, and not sucking. FOB supplemented with a bottle, and patient set up with double electric pump. Reviewed all pump settings, and was starting to get good drops. Patient needed to take a break from pumping to use rest room. Encouraged to pump again later. All questions answered from patient. Reviewed triple feeding plan, and scheduled to come in for a outpatient LC visit. Encouraged frequent pumping once home, and patient has a  spectra pump at home

## 2025-03-12 NOTE — PROGRESS NOTES
[facility]  part of [facility]    OB/GYNE Progress Note      Monet Blanc Patient Status:  Inpatient    2000 MRN [id number]   Location [facility] 3SE Attending Jazmine Plascencia, ANIKET   Hosp Day # 2 PCP Inocente Butt, DO        Assessment/Plan       Monet Blanc is a 24 year old , day 2 after a vaginal delivery  Breast and formula feeding. Having some difficulty with latch, has been working with LC and pumping. Has pump at home.  Postpartum teaching completed including danger signs and when to call the CNM       Anxiety and depression  Stable on sertraline       Maternal varicella, non-immune ([facility])  Offer vaccine prior to discharge       Obesity in pregnancy ([facility])  Delivered       Group B streptococcal carriage complicating pregnancy ([facility])  Adequately treated           (normal spontaneous vaginal delivery) ([facility])  -Stable PP Day 2. Discharge instructions and warning signs reviewed. F/u with CNM in 2 wks for video visit and 6 wks for in person visit.         Postpartum anemia ([facility])  Asymptomatic  IV Iron x 1 dose however then IV came out. Home on PO iron every other day        Discussed with: {     nurse     patient and partner     Plan discussed with patient who verbalizes understanding and agreement.        Subjective   Feeling well. Bleeding decreasing. Has been pumping and supplementing with formula as baby not latching.     Review of Systems:      Gynecological: lochia  Gastrointestinal: denies  Genitourinary: denies  Constitutional: denies  Cardiovascular: denies  Respiratory: denies  Neurologic: denies  Psychiatric: denies        Objective   Vital signs in last 24 hours:  Temp:  [98.4 °F (36.9 °C)-98.9 °F (37.2 °C)] 98.9 °F (37.2 °C)  Pulse:  [59-77] 77  Resp:  [16-18] 16  BP: (125-128)/(55-74) 125/74    Input/Output:  No intake or output data in the 24 hours ending 25 1043    Weight (Last 6):  Wt Readings from Last 6  Encounters:   03/10/25 234 lb (106.1 kg)   03/06/25 234 lb (106.1 kg)   02/26/25 231 lb 6.4 oz (105 kg)   02/20/25 228 lb 6.4 oz (103.6 kg)   01/28/25 225 lb (102.1 kg)   01/15/25 219 lb 8 oz (99.6 kg)     Body mass index is 38.94 kg/m².     Exam:  Breasts: Nipples intact, no cracking or bleeding, + milky discharge  Fundus firm, non-tender, 1FB below umbilicus  Lochia: small rubra  Perineum: well approximated, no swelling, no hematoma  Calves: Non-tender, no edema, Neg Homans       Mcdowell Catheter Information  Does patient have a mcdowell catheter: no  Has the mcdowell catheter been removed: Not Applicable          Results:     Lab Results   Component Value Date    TREPONEMALAB Nonreactive 03/10/2025    ABO AB 03/10/2025    RH Positive 03/10/2025    WBC 12.3 (H) 03/11/2025    HGB 9.5 (L) 03/11/2025    HCT 28.2 (L) 03/11/2025    .0 03/11/2025    GLU 94 06/28/2024    T4F 0.73 02/15/2014    TSH 0.812 09/16/2020       Lab Results   Component Value Date    COLORUR Yellow 01/16/2020    CLARITY Hazy (A) 01/16/2020    SPECGRAVITY 1.006 01/16/2020    PROUR Negative 01/16/2020    GLUUR Negative 01/16/2020    KETUR Negative 01/16/2020    BILUR Negative 01/16/2020    BLOODURINE Small (A) 01/16/2020    NITRITE Negative 01/16/2020    UROBILINOGEN <2.0 01/16/2020    LEUUR Negative 01/16/2020       No results found.          Jazmine Plascencia CNM  3/12/2025  10:43 AM

## 2025-03-12 NOTE — DISCHARGE INSTRUCTIONS
Next Tylenol at 5pm  Discharge Instructions for Vaginal Delivery  Follow-up  Schedule a  follow-up exam with the midwife who delivered you in 2 weeks and 6 weeks. Please remember to call as soon as possible for this appointment. After having a baby, your body may be very tired. It can take time to recover from a vaginal delivery. Please remember this and call if you have any questions or concerns before your 6 week appointment.   Medications    Please take Motrin at home for pain control 600mg every 6 hours as needed  Continue taking your Prenatal Vitamin daily, as long as you are nursing  Ferrous Sulfate 325 mg once every other day (may obtain in form of Gentle Iron, Slow FE, or liquid Floradix)  Vitamin D3 2000 IU daily  Colace (stool softener)  once or twice per day as needed  If you have stitches  You may have received stitches in the skin near your vagina. The stitches might have closed an episiotomy (an incision that enlarges the opening of the vagina). Or you may have needed stitches to repair torn skin. Either way, your stitches should dissolve within weeks. Until then, you can help reduce discomfort, aid healing, and reduce your risk of infection by keeping the stitches clean. These tips can help:  Gently wipe from front to back after you urinate or have a bowel movement.  After wiping, spray warm water on the area. Or you can have a sitz bath. This means sitting in a tub with a few inches of water in it. Then pat the area dry or use a hairdryer on a cool setting.  You can take a shower unless told not to.  Change sanitary pads at least every 2 to 4 hours.  Place cold packs as need, but remember to keep a thin towel between the pack and your skin.  Activity  Here are some suggestions:  Get lots of rest. Take naps as often as your can.   Increase your activities gradually.   Don’t have sexual intercourse until after you’ve had a follow-up appointment and you have decided on a birth control  method.  Remember your are on pelvic rest, so nothing in your vagina (tampons etc...), no tub baths, and no swimming pools.       When to call your healthcare provider  Call your health care provider right away if you have:  A fever of 100.4°F (38.0°C) or higher  Bleeding that requires a new sanitary pad after an hour, or large blood clots. Remember your bleeding will wax and wane. Please call if you are consistently saturating a pad and hour.   Pain in your vagina that gets worse and isn't relieved with medicine.  Burning, pain, red streaks, or lumpy areas in your breasts that may be accompanied by flu-like symptoms  Nausea or vomiting  Dizziness or fainting  Feelings of extreme sadness or anxiety, or a feeling that you don’t want to be with your baby  Abdominal pain that isn’t relieved with medicine  No bowel movement for 5 days  Redness, warmth, or pain in the lower leg  Chest pain           For Outpatient Lactation Appointment- We are located in [facility] office next to [facility] on the first floor, try not to fed baby for 2 hours before appt, and bring your breast pump with you, any milk or formula you are supplementing with, and the bottles you are using.     [facility] has great support for our families even after discharge.  We have virtual or in-person support groups.  Visit our website for the most up-to-date info for our many different support groups. [email or web address]       Outpatient Lactation appointments:  Call [phone]- to schedule an appt.  Our office is located in the Maternal Fetal Medicine office next to [facility] on the first floor.        Support Groups: Moms-to-be are also welcome! All mom's welcome even if its not your first.     MOM & BABY HOUR- Every new mom can use some support in the exciting but challenging adjustment to motherhood. Join us for Mom and Baby Hour where an experienced nurse and lactation consultant will guide conversations  and answer questions and provide breastfeeding support.  Most weeks we will also have a guest speaker to present information on many different parenting topics. We welcome mothers and their infants (up to crawling), dad, grandmas, and others to join our group.    Meets most  10:00 - 11:30 a.m.  Masks are not required, but be considerate of others and do not attend if mom or baby have had any symptoms of illness within the previous 24 hours. Location [facility] [place] [address] [place] Go inside the front door and to the right to the “Community Education Room”.      Nurturing Mom- A support group for new and expectant moms looking for support with the transition to parenthood as well as those experiencing symptoms of  anxiety and/or depression.  Please contact [email or web address] if you need directions or the link for the virtual meetings. Please contact [email or web address] if you plan to attend, but please be considerate of others and do not attend if mom or baby have had any symptoms of illness within the previous 24 hours.       Mom's Line: [phone]  A phone line dedicated for women (or anyone worried about a women) who may be experiencing signs or symptoms of postpartum depression, anxiety, or overwhelmed with new baby. Call - answered by live trained mental health professionals, free and confidential, emotional support, referrals, in any language.    La Leche League for breast feeding and parent support, Website: IIIi.org  and for the [place] group and other groups visit https://www.facebook.com/pg/Cyndi/events/    Facebook groups-  for more support when home- Babies & Mommies [facility] --- you can find mom-to-mom advice and the list of speaker topics for cradle talk program.  Telephone Support  Postpartum depression [place] (www.ppdil.org)  -Free information and support for pregnant and postpartum women with symptoms  of depression, anxiety  -Mom-to-mom support from volunteers who have been through depression    Telephone support: (310) [phone]  Urgent support:(055)-127-3182 (answered 24/7)  Email support: support@ppdil.org    Postpartum Support International (www.postpartum.net)  -Free phone conversation with trained volunteers   (245) 449-4767; after the prompt enter 02063#    National Postpartum Depression Hotline  (362)-PPD-MOMS    Helpful websites:    www.llli.[employer]  www.[employer]  www.Breastfeed[place].org    Initiation of breast pumping after discharge:     - Add 1 pumping session a day, additional to the infant's feedings, 2-3 weeks after delivery.     - Pump both breasts for 15 mins, immediately after a breast feeding session.    - Pumping first thing in the morning will provide greater output.    - If you chose to pump more than once a day, you should be consistent every day to prevent a breast infection.      - Pump using an electric pump over a hands free pump (electric pumps provided stronger stimulation to the nipples).    - Store the breast milk in 2-3 oz containers.     - Label containers with date and time.    - Always feed oldest milk first.

## 2025-03-12 NOTE — DISCHARGE SUMMARY
[facility]  part of [facility]    Discharge Summary    Monet Blanc Patient Status:  Inpatient    2000 MRN [id number]   Location [facility] 3SE Attending Jazmine Plascencia CNM   Hosp Day # 2       Delivering OB Clinician: Jazmine Plascencia CNM    EDC: Estimated Date of Delivery: 3/7/25    Gestational Age: 40w3d    Antepartum complications:   Patient Active Problem List   Diagnosis    Anxiety and depression    ADHD    Maternal varicella, non-immune (HCC)    Obesity in pregnancy (HCC)    Pregnant ([facility])    Group B streptococcal carriage complicating pregnancy (HCC)    Pregnancy (HCC)    Normal labor ([facility])     (normal spontaneous vaginal delivery) ([facility])    Postpartum anemia ([facility])       Date of Delivery: 3/10/2025 Time of Delivery: 10:33 AM    Delivery Type: spontaneous vaginal delivery    Baby: Liveborn female   Information for the patient's :  [place], Girl [[id number]]   8 lb 2.5 oz (3.7 kg)  Apgars:  1 minute: 8  5 minutes: 910 minutes:       Intrapartum Complications: None    Admit Date: 3/10/2025    Discharge Date: 3/12/2025    Hospital Course: No complications Routine delivery and postpartum care. Mild asymptomatic anemia received IV Iron and home on PO Iron.     Discharged Condition: stable PP Day 2. Mild anemia home on iron.     Disposition: home    Plan:     Follow-up appointment in 2 weeks with ANIKET Lopez CNM  3/12/2025  10:48 AM

## 2025-03-12 NOTE — LACTATION NOTE
This note was copied from a baby's chart.  LACTATION NOTE - INFANT    Evaluation Type  Evaluation Type: Inpatient    Problems & Assessment  Problems Diagnosed or Identified: Latch difficulty, Tongue restriction  Problems: comment/detail: has not breast fed in over 36 hours  Infant Assessment: Anterior fontanel soft and flat, Oral mucous membranes moist, Abdomen soft, non-distended, Skin color: pink or appropriate for ethnicity, Hunger cues present  Muscle tone: Appropriate for GA    Feeding Assessment  Summary Current Feeding: Breastfeeding with breast milk supplement, Breastfeeding with formula supplement, Breastfeeding using a nipple shield  Last 24 hour feeding summary: only formula  Breastfeeding Assessment: Assisted with breastfeeding w/mother's permission, Pulling on nipple, Nipple shield initiated with non-nutritive suckling, Sustained nutritive latch using nipple shield, Calm and ready to breastfeed  Breastfeeding Positions: football, right breast  Latch: Repeated attempts, hold nipple in mouth, stimulate to suck  Audible Sucks/Swallows: A few with stimulation  Type of Nipple: Everted (after stimulation)  Comfort (Breast/Nipple): Filling, red/small blisters/bruises, mild/mod discomfort  Hold (Positioning): Full assist, staff holds infant at breast  LATCH Score: 5         Pre/Post Weights  Supplement Type: Formula    Equipment used  Equipment used: Bottle with slow flow nipple

## 2025-03-18 ENCOUNTER — TELEPHONE (OUTPATIENT)
Dept: FAMILY MEDICINE CLINIC | Facility: CLINIC | Age: 25
End: 2025-03-18

## 2025-03-20 ENCOUNTER — LACTATION ENCOUNTER (OUTPATIENT)
Dept: LACTATION | Facility: HOSPITAL | Age: 25
End: 2025-03-20

## 2025-03-20 ENCOUNTER — NURSE ONLY (OUTPATIENT)
Dept: LACTATION | Facility: HOSPITAL | Age: 25
End: 2025-03-20
Attending: ADVANCED PRACTICE MIDWIFE
Payer: MEDICAID

## 2025-03-20 PROCEDURE — 99213 OFFICE O/P EST LOW 20 MIN: CPT

## 2025-03-20 NOTE — LACTATION NOTE
This note was copied from a baby's chart.     03/20/25 8119   Evaluation Type   Evaluation Type Outpatient Initial   Problems & Assessment   Problems Diagnosed or Identified Latch difficulty   Infant Assessment Hunger cues present   Muscle tone Appropriate for GA   Feeding Assessment   Summary Current Feeding Pumping and feeding by bottle;Infant not latching to breast   Last 24 hour feeding summary mostly formula given   Breastfeeding Assessment Assisted with breastfeeding w/mother's permission;No sustained latch to breast;Calm and ready to breastfeed   Breastfeeding lasted # of minutes 0   Breastfeeding Positions cross cradle;left breast   Latch 0   Audible Sucks/Swallows 0   Type of Nipple 1   Comfort (Breast/Nipple) 2   Hold (Positioning) 0   LATCH Score 3   Pre/Post Weights   Pre-Weight Left Breast (g) 3786   Supplement Type (other) Enfamil   Supplement total, ml 60  (slightly more than 60ml)   Equipment used   Equipment used Bottle with slow flow nipple

## 2025-03-20 NOTE — LACTATION NOTE
03/20/25 1812   Evaluation Type   Evaluation Type Outpatient Initial   Problems identified   Problems identified Knowledge deficit;Milk supply not WNL   Milk supply not WNL Reduced (potential)   Problems Identified Other bottles of formula given   Maternal history   Maternal history Anxiety   Breastfeeding goal   Breastfeeding goal To maintain breast milk feeding per patient goal   Maternal Assessment   Bilateral Breasts Soft;Symmetrical;Elastic;Pendulous   Bilateral Nipples Slightly everted/short;Colostrum easily expressed   Prior breastfeeding experience (comment below) Primip   Breastfeeding Assistance Breastfeeding assistance provided with permission   Pain assessment   Location/Comment denies   Guidelines for use of:   Breast pump type Ameda Platinum;Hand Pump;Spectra   Suggested use of pump Pump if infant is not latching to breast;Pump each time a supplement is offered;Pump after nursing if a nipple shield is used;Pump 8-12X/24hr

## 2025-03-20 NOTE — PROGRESS NOTES
Situation:    Monet would like to discuss options for feeding since she has had a hard time latching Mary.    Background:    Monet has been trying to breast feed Mary every once in a while but has had a hard time with latching her.  She has been trying to pump when she can and tries to pump about every 2-3 hours and gets about 3-4 ounces.  Yesterday she pumped one time. She has been feeding bottles of formula and Mary has been taking about 2 ounces at each feeding.    Monet scored a 10 on the EPDS.  She has an appointment already made with her psychiatrist (April 3) and plans on discussing getting back on her zoloft or possibly another medication.  She had stopped taking zoloft for about a month when pregnant.      BW: 8 pounds 2.5 ounces (3700 grams)    Last wt: 7 pounds 14.5 ounces (3586 grams)    Today's wt: 8 pounds 5.5 ounces (3786 grams)    Void/Stool: appropriate voids/ now green stools since taking mostly formula, but had yellow seedy stools when receiving pumped breast milk      Assessment:    Mary's oral assessment, there were no blisters noted to her lips, she was able to attach to this LC's gloved finger, she was able to lateralize her tongue to both sides of her mouth, there appeared to be a slight ligament under the tongue but did not go to the end of the tongue, but able to lift her tongue.   Breastfeeding: Monet attempted to put Mary to her left breast in cross cradle position and had a difficult time attaining a latch. Formula drops were placed on her breast to tempt her to suck and she did attain a latch with a few sucking bursts and then came off.  She was then given a bottle of formula and shown how to do a paced bottle feeding.  Monet said that she normally is fed with a different bottle and nipple type and that she had brought a faster flowing nipple this time.      Bottle: 2 ounces        Recommendations:    Monet is considering the option to bottle  feed Mary either pumped breast milk and or formula.  Several different options were discussed.   Monet your baby is beautiful and she is growing very well and has exceeded her birth weight prior to the two week sophie. You just need to decide which option of feeding that would be the best for your family.   It was my pleasure working with all of you.

## 2025-03-24 ENCOUNTER — TELEPHONE (OUTPATIENT)
Dept: OBGYN UNIT | Facility: HOSPITAL | Age: 25
End: 2025-03-24

## 2025-03-24 ENCOUNTER — TELEMEDICINE (OUTPATIENT)
Dept: OBGYN CLINIC | Facility: CLINIC | Age: 25
End: 2025-03-24

## 2025-03-25 NOTE — PROGRESS NOTES
This visit is conducted using Telemedicine with live, interactive video and audio.    Patient has been referred to [facility] website at [email or web address] to review the yearly Consent to Treat document.    Patient understands and accepts financial responsibility for any deductible, co-insurance, and/or co-pays associated with this service.     Subjective: Monet is 2 weeks postpartum after a vaginal delivery of a baby girl.  See L&D delivery summary for birth statistics.  She is without concerns today. Bleeding is decreasing and not experiencing any excessive pain.    She is pumping and formula feeding and reports that baby is gaining weight    She denies any signs/symptoms of postpartum depression and has adequate family support. She was taking zoloft and felt that her anxiety had increased so she stopped this. She feels moods are more stable. She is working with her psychiatrist.      Contraceptive plan: unsure    Review of Systems   All other systems reviewed and are negative.      Objective:   There were no vitals filed for this visit.  Wt Readings from Last 6 Encounters:   03/10/25 234 lb (106.1 kg)   03/06/25 234 lb (106.1 kg)   02/26/25 231 lb 6.4 oz (105 kg)   02/20/25 228 lb 6.4 oz (103.6 kg)   01/28/25 225 lb (102.1 kg)   01/15/25 219 lb 8 oz (99.6 kg)       Physical Exam  Constitutional:       Appearance: Normal appearance.   Neurological:      Mental Status: She is alert and oriented to person, place, and time.   Psychiatric:         Mood and Affect: Mood normal.         Behavior: Behavior normal.         Thought Content: Thought content normal.         Judgment: Judgment normal.          Assessment/Plan:   2 weeks postpartum, stable. Breast and Formula feeding without difficulty  Contraception: unsure    Return to clinic: 6 week postpartum visit    Counseling included:   Signs/symptoms of postpartum depression  Postpartum danger signs  Lactation support and troubleshooting  Maternal and family  adaption  Sleep/rest strategies  Sexuality  Infant safety and care  Parenting concerns  Contraception    Monet verbalized understanding of plan of care. All questions answered. No barriers to learning identified.

## 2025-03-26 ENCOUNTER — TELEPHONE (OUTPATIENT)
Dept: OBGYN UNIT | Facility: HOSPITAL | Age: 25
End: 2025-03-26

## 2025-04-21 PROBLEM — O99.210 OBESITY IN PREGNANCY (HCC): Status: RESOLVED | Noted: 2024-09-18 | Resolved: 2025-04-21

## 2025-04-21 PROBLEM — O99.820 GROUP B STREPTOCOCCAL CARRIAGE COMPLICATING PREGNANCY (HCC): Status: RESOLVED | Noted: 2025-02-26 | Resolved: 2025-04-21

## 2025-04-21 PROBLEM — Z34.90 PREGNANT (HCC): Status: RESOLVED | Noted: 2025-02-20 | Resolved: 2025-04-21

## 2025-04-21 PROBLEM — Z34.90 PREGNANCY (HCC): Status: RESOLVED | Noted: 2025-03-10 | Resolved: 2025-04-21

## 2025-04-21 PROBLEM — Z37.9 NORMAL LABOR (HCC): Status: RESOLVED | Noted: 2025-03-10 | Resolved: 2025-04-21

## 2025-04-21 NOTE — PROGRESS NOTES
Patient here for postpartum check-up.spontaneous vaginal delivery 3/10/2025  of a viable baby girl with myself.    OB History    Para Term  AB Living   1 1 1 0 0 1   SAB IAB Ectopic Multiple Live Births   0 0 0 0 1      # Outcome Date GA Lbr Markus/2nd Weight Sex Type Anes PTL Lv   1 Term 03/10/25 40w3d 10:10 :23 8 lb 2.5 oz (3.7 kg) F NORMAL SPONT EPI N ILEANA      Complications: Meconium, Group B beta strep +        Lab Results   Component Value Date    WBC 12.3 (H) 2025    HGB 9.5 (L) 2025    HCT 28.2 (L) 2025    .0 2025    NEPERCENT 72.2 2025    LYPERCENT 20.6 2025    MOPERCENT 5.7 2025    EOPERCENT 0.8 2025    BAPERCENT 0.2 2025    NE 8.89 (H) 2025    LYMABS 2.54 2025    MOABSO 0.70 2025    EOABSO 0.10 2025    BAABSO 0.02 2025        Formula feeding. Was pumping but got to be too much.  Baby with adequate weight gain.   Denies fevers, chills, body aches and flu-like symptoms.  Denies abdominal pain, no pelvic pain, reports no vaginal bleeding. Bleeding stopped    Denies dysuria, urinary frequency and urinary incontinence.  Denies constipation, painful bowel movements and fecal incontinence.  Denies symptoms of postpartum depression including mood, affect, appetite / activity level changes. Has adequate support at home. Stopped taking her meds. Anxiety has been ok since stopping. Seeing therapist on Monday, plans to start seeing her weekly.     Perineum concerns: no pain, no hemorrhoids, 1st degree vaginal laceration repair  EPDS Score: Total: 5  DIANDRA screen: 3  Considering Condoms for BC method  Requesting Vitamin D level to be checked, has hx of vitamin D deficiency    Physical Exam  Constitutional:       General: She is not in acute distress.     Appearance: Normal appearance. She is not ill-appearing.   Pulmonary:      Effort: Pulmonary effort is normal.   Genitourinary:     General: Normal vulva.       Labia:         Right: No rash, tenderness, lesion or injury.         Left: No rash, tenderness, lesion or injury.       Comments: Laceration well healed  Neurological:      Mental Status: She is alert and oriented to person, place, and time.   Psychiatric:         Mood and Affect: Mood normal.         Behavior: Behavior normal.         Thought Content: Thought content normal.        1FB Darby Rectkaryn Healy was seen today for postpartum care.    Diagnoses and all orders for this visit:    Postpartum exam (HCC)    Postpartum anemia (HCC)  -     CBC, Platelet; No Differential; Future    Encounter for vitamin deficiency screening  -     Vitamin D; Future        A: Normal PP involution      Breastfeeding   P: Continue to breastfeed exclusively, continue PNV while breastfeeding and for preconception.       Condoms for BC method  CBC ordered  Last pap 8/2024, UTD  F/U 1 yr for annual exam or PRN

## 2025-04-22 ENCOUNTER — LAB ENCOUNTER (OUTPATIENT)
Dept: LAB | Facility: HOSPITAL | Age: 25
End: 2025-04-22
Attending: ADVANCED PRACTICE MIDWIFE
Payer: MEDICAID

## 2025-04-22 ENCOUNTER — POSTPARTUM (OUTPATIENT)
Dept: OBGYN CLINIC | Facility: CLINIC | Age: 25
End: 2025-04-22

## 2025-04-22 ENCOUNTER — TELEPHONE (OUTPATIENT)
Dept: OBGYN CLINIC | Facility: CLINIC | Age: 25
End: 2025-04-22

## 2025-04-22 VITALS
HEIGHT: 65.5 IN | DIASTOLIC BLOOD PRESSURE: 87 MMHG | SYSTOLIC BLOOD PRESSURE: 139 MMHG | HEART RATE: 85 BPM | WEIGHT: 211 LBS | BODY MASS INDEX: 34.73 KG/M2

## 2025-04-22 DIAGNOSIS — Z13.21 ENCOUNTER FOR VITAMIN DEFICIENCY SCREENING: ICD-10-CM

## 2025-04-22 LAB — VIT D+METAB SERPL-MCNC: 21.1 NG/ML (ref 30–100)

## 2025-04-22 PROCEDURE — 36415 COLL VENOUS BLD VENIPUNCTURE: CPT

## 2025-04-22 PROCEDURE — 82306 VITAMIN D 25 HYDROXY: CPT

## 2025-04-22 NOTE — TELEPHONE ENCOUNTER
Outgoing call placed to lab. Unable to draw CBC from existing specimen as the Vitamin D lab is drawn in a different specimen tube.

## 2025-04-22 NOTE — TELEPHONE ENCOUNTER
Please check with lab to see if they can add CBC onto blood they susan. I'm not sure what happened, but I could of sworn I placed the order for the CBC and the Vitamin D, but then I didn't see the CBC. I just added it. If they cannot add can you please call pt and let her know that if she would like the CBC she will need to come to lab. We could also order it through [facility] if [facility] is closer to her house. Please apologize for me. Thanks MJ

## 2025-04-22 NOTE — TELEPHONE ENCOUNTER
Pt verified name and .     Informed pt that CBC order was not completed at the time that pt had labs drawn after postpartum visit. Informed pt that CBC has now been ordered for pt to complete at their earliest convenience. Pt states they will try to have lab drawn tomorrow. Plans on going to [facility] and does not need lab ordered to [facility].

## 2025-06-19 ENCOUNTER — LAB ENCOUNTER (OUTPATIENT)
Dept: LAB | Age: 25
End: 2025-06-19
Attending: ADVANCED PRACTICE MIDWIFE
Payer: MEDICAID

## 2025-06-19 LAB
DEPRECATED RDW RBC AUTO: 38.7 FL (ref 35.1–46.3)
ERYTHROCYTE [DISTWIDTH] IN BLOOD BY AUTOMATED COUNT: 12 % (ref 11–15)
HCT VFR BLD AUTO: 39.4 % (ref 35–48)
HGB BLD-MCNC: 13.2 G/DL (ref 12–16)
MCH RBC QN AUTO: 29.3 PG (ref 26–34)
MCHC RBC AUTO-ENTMCNC: 33.5 G/DL (ref 31–37)
MCV RBC AUTO: 87.6 FL (ref 80–100)
PLATELET # BLD AUTO: 442 10(3)UL (ref 150–450)
RBC # BLD AUTO: 4.5 X10(6)UL (ref 3.8–5.3)
WBC # BLD AUTO: 10.7 X10(3) UL (ref 4–11)

## 2025-06-19 PROCEDURE — 36415 COLL VENOUS BLD VENIPUNCTURE: CPT

## 2025-06-19 PROCEDURE — 85027 COMPLETE CBC AUTOMATED: CPT

## 2025-08-11 ENCOUNTER — OFFICE VISIT (OUTPATIENT)
Dept: FAMILY MEDICINE CLINIC | Facility: CLINIC | Age: 25
End: 2025-08-11

## 2025-08-11 VITALS
HEIGHT: 65.5 IN | DIASTOLIC BLOOD PRESSURE: 78 MMHG | BODY MASS INDEX: 34.95 KG/M2 | WEIGHT: 212.31 LBS | HEART RATE: 103 BPM | SYSTOLIC BLOOD PRESSURE: 117 MMHG

## 2025-08-11 DIAGNOSIS — L72.3 SEBACEOUS CYST: Primary | ICD-10-CM

## (undated) NOTE — MR AVS SNAPSHOT
[facility]  [address] [place] [address], [address] [place] [address]               Thank you for choosing us for your health care visit with Jose M Grey. DO Daquan.   We are glad to serve you and happy to provide you with this summary Fact Sheet: Healthy Active Living for Families    Healthy nutrition starts as early as infancy with breastfeeding. Once your baby begins eating solid foods, introduce nutritious foods early on and often.  Sometimes toddlers need to try a food 10 times befor

## (undated) NOTE — Clinical Note
State [employer] of Child Health Examination       Student's Name  Zoltan Butler Title                           Date    (If adding dates to the above immunization history section, put your initials by date(s) and sign here.)   ALTERNATIVE PROOF OF IMMUNITY   1 Review of patient's allergies indicates no known allergies. MEDICATION  (List all prescribed or taken on a regular basis.)  No current outpatient prescriptions on file. Diagnosis of asthma?   Child wakes during the night coughing  Yes       No   Yes (70.67 kg)  BMI 25.46 kg/m2  LMP 04/20/2017    DIABETES SCREENING  BMI>85% age/sex  Non And any two of the following:  Family History       n             Ethnic Minority        N                     Signs of Insulin Resistance (hypertension, dyslipidemia, Quick-relief  medication (e.g. Short Acting Beta Antagonist): No          Controller medication (e.g. inhaled corticosteroid):   No Other   NEEDS/MODIFICATIONS required in the school setting  None DIETARY Needs/Restrictions     None   SPECIAL INSTR

## (undated) NOTE — ED AVS SNAPSHOT
Rickey Do   MRN: [id number]    Department:  [facility] Emergency Department   Date of Visit:  1/16/2020           Disclosure     Insurance plans vary and the physician(s) referred by the ER may not be covered by your plan.  Please conta CARE PHYSICIAN AT ONCE OR RETURN IMMEDIATELY TO THE EMERGENCY DEPARTMENT. If you have been prescribed any medication(s), please fill your prescription right away and begin taking the medication(s) as directed.   If you believe that any of the medications

## (undated) NOTE — ED AVS SNAPSHOT
[facility] Immediate Care in [facility].  [address] [facility]    Phone:  [phone]    Fax:  [phone] [facility] [place]   MRN: [id number]    Department:  [facility] Immediate Care in [facility] of Vis benefit level being available to you or other limited reimbursement. The physician may seek payment directly from you for amounts other than your deductible, co-payment, or co-insurance and for other services not covered under your health insurance plan. If you believe that any of the medications or instructions on this list is different from what your Primary Care doctor has instructed you - please continue to take your medications as instructed by your Primary Care doctor until you can check with your do Patient [address] to help you get signed up for insurance coverage. Patient [address] is a Federal Navigator program that can help with your Affordable Care Act coverage, as well as all types of Medicaid plans.   To get signed up and covere

## (undated) NOTE — LETTER
[address]  [facility] [address]  Dept: [phone]  Dept Fax: [phone]  Loc: [phone]      September 20, 2017    Patient: Danielle Salazar   Date of Visit: 9/20/2017       To Whom It May Concern:

## (undated) NOTE — LETTER
[address]  [facility] 5  Dept: [phone]  Dept Fax: [phone]  Loc: [phone]      February 22, 2017    Patient: Marco Jonascal   Date of Visit: 2/22/2017       To Whom It May Concern:

## (undated) NOTE — LETTER
[facility]  Administration of Anesthesia  Monet DUBOSE agree to be cared for by a physician anesthesiologist alone and/or with a nurse anesthetist, who is specially trained to monitor me and give me medicine to put me to sleep or keep me comfortable during my procedure    I understand that my anesthesiologist and/or anesthetist is not an employee or agent of [facility] or [employer]. He or she works for [facility]    As the patient asking for anesthesia services, I agree to:  Allow the anesthesiologist (anesthesia doctor) to give me medicine and do additional procedures as necessary. Some examples are: Starting or using an “IV” to give me medicine, fluids or blood during my procedure, and having a breathing tube placed to help me breathe when I’m asleep (intubation). In the event that my heart stops working properly, I understand that my anesthesiologist will make every effort to sustain my life, unless otherwise directed by [facility] Do Not Resuscitate documents.  Tell my anesthesia doctor before my procedure:  If I am pregnant.  The last time that I ate or drank.  iii. All of the medicines I take (including prescriptions, herbal supplements, and pills I can buy without a prescription (including street drugs/illegal medications). Failure to inform my anesthesiologist about these medicines may increase my risk of anesthetic complications.  iv.If I am allergic to anything or have had a reaction to anesthesia before.  I understand how the anesthesia medicine will help me (benefits).  I understand that with any type of anesthesia medicine there are risks:  The most common risks are: nausea, vomiting, sore throat, muscle soreness, damage to my eyes, mouth, or teeth (from breathing tube placement).  Rare risks include: remembering what happened during my procedure, allergic reactions to medications, injury to my airway, heart, lungs, vision,  nerves, or muscles and in extremely rare instances death.  My doctor has explained to me other choices available to me for my care (alternatives).  Pregnant Patients (“epidural”):  I understand that the risks of having an epidural (medicine given into my back to help control pain during labor), include itching, low blood pressure, difficulty urinating, headache or slowing of the baby’s heart. Very rare risks include infection, bleeding, seizure, irregular heart rhythms and nerve injury.  Regional Anesthesia (“spinal”, “epidural”, & “nerve blocks”):  I understand that rare but potential complications include headache, bleeding, infection, seizure, irregular heart rhythms, and nerve injury.    _____________________________________________________________________________  Patient (or Representative) Signature/Relationship to Patient  Date   Time    _____________________________________________________________________________   Name (if used)    Language/Organization   Time    _____________________________________________________________________________  Nurse Anesthetist Signature     Date   Time  _____________________________________________________________________________  Anesthesiologist Signature     Date   Time  I have discussed the procedure and information above with the patient (or patient’s representative) and answered their questions. The patient or their representative has agreed to have anesthesia services.    _____________________________________________________________________________  Witness        Date   Time  I have verified that the signature is that of the patient or patient’s representative, and that it was signed before the procedure  Patient Name: Monet Blanc     : 2000                 Printed: 3/10/2025 at 2:51 AM    Medical Record #: [id number]                                            Page 1 of 1  ----------ANESTHESIA CONSENT----------

## (undated) NOTE — LETTER
VACCINE ADMINISTRATION RECORD  PARENT / GUARDIAN APPROVAL  Date: 2024  Vaccine administered to: Monet Blanc     : 2000    MRN: [id number]    A copy of the appropriate Centers for Disease Control and Prevention Vaccine Information statement has been provided. I have read or have had explained the information about the diseases and the vaccines listed below. There was an opportunity to ask questions and any questions were answered satisfactorily. I believe that I understand the benefits and risks of the vaccine cited and ask that the vaccine(s) listed below be given to me or to the person named above (for whom I am authorized to make this request).    VACCINES ADMINISTERED:  Tdap    I have read and hereby agree to be bound by the terms of this agreement as stated above. My signature is valid until revoked by me in writing.  This document is signed by Monet Blanc, relationship: Self on 2024.:                                                                                                                                         Parent / Guardian Signature                                                Date

## (undated) NOTE — Clinical Note
VACCINE ADMINISTRATION RECORD  PARENT / GUARDIAN APPROVAL  Date: 2017  Vaccine administered to: Marco Paulson     : 2000    MRN: [id number]    A copy of the appropriate Centers for Disease Control and Prevention Vaccine Information statem

## (undated) NOTE — LETTER
Date & Time: 4/1/2019, 10:54 AM  Patient: Fabian Pulse  Encounter Provider(s):    Max Rios MD       To Whom It May Concern:    Lucian Chatterjee was seen and treated in our department on 4/1/2019.  She should not return to school until 4/2/1